# Patient Record
Sex: FEMALE | Race: WHITE | Employment: FULL TIME | URBAN - METROPOLITAN AREA
[De-identification: names, ages, dates, MRNs, and addresses within clinical notes are randomized per-mention and may not be internally consistent; named-entity substitution may affect disease eponyms.]

---

## 2018-12-07 ENCOUNTER — OFFICE VISIT (OUTPATIENT)
Dept: FAMILY MEDICINE CLINIC | Age: 23
End: 2018-12-07
Payer: COMMERCIAL

## 2018-12-07 VITALS
HEIGHT: 66 IN | RESPIRATION RATE: 18 BRPM | BODY MASS INDEX: 46.77 KG/M2 | SYSTOLIC BLOOD PRESSURE: 120 MMHG | HEART RATE: 98 BPM | DIASTOLIC BLOOD PRESSURE: 80 MMHG | TEMPERATURE: 98.5 F | OXYGEN SATURATION: 99 % | WEIGHT: 291 LBS

## 2018-12-07 DIAGNOSIS — E66.01 CLASS 3 SEVERE OBESITY DUE TO EXCESS CALORIES WITHOUT SERIOUS COMORBIDITY WITH BODY MASS INDEX (BMI) OF 45.0 TO 49.9 IN ADULT (HCC): ICD-10-CM

## 2018-12-07 DIAGNOSIS — R06.81 APNEIC EPISODE: ICD-10-CM

## 2018-12-07 DIAGNOSIS — F32.A ANXIETY AND DEPRESSION: ICD-10-CM

## 2018-12-07 DIAGNOSIS — R53.82 CHRONIC FATIGUE: Primary | ICD-10-CM

## 2018-12-07 DIAGNOSIS — F41.9 ANXIETY AND DEPRESSION: ICD-10-CM

## 2018-12-07 PROCEDURE — G8417 CALC BMI ABV UP PARAM F/U: HCPCS | Performed by: FAMILY MEDICINE

## 2018-12-07 PROCEDURE — G8484 FLU IMMUNIZE NO ADMIN: HCPCS | Performed by: FAMILY MEDICINE

## 2018-12-07 PROCEDURE — G8427 DOCREV CUR MEDS BY ELIG CLIN: HCPCS | Performed by: FAMILY MEDICINE

## 2018-12-07 PROCEDURE — 99214 OFFICE O/P EST MOD 30 MIN: CPT | Performed by: FAMILY MEDICINE

## 2018-12-07 PROCEDURE — 1036F TOBACCO NON-USER: CPT | Performed by: FAMILY MEDICINE

## 2018-12-07 RX ORDER — VENLAFAXINE HYDROCHLORIDE 75 MG/1
75 CAPSULE, EXTENDED RELEASE ORAL DAILY
Qty: 90 CAPSULE | Refills: 3 | Status: SHIPPED
Start: 2018-12-07 | End: 2020-07-14 | Stop reason: CLARIF

## 2018-12-07 ASSESSMENT — PATIENT HEALTH QUESTIONNAIRE - PHQ9
2. FEELING DOWN, DEPRESSED OR HOPELESS: 0
SUM OF ALL RESPONSES TO PHQ9 QUESTIONS 1 & 2: 0
SUM OF ALL RESPONSES TO PHQ QUESTIONS 1-9: 0
SUM OF ALL RESPONSES TO PHQ QUESTIONS 1-9: 0
1. LITTLE INTEREST OR PLEASURE IN DOING THINGS: 0

## 2018-12-19 ENCOUNTER — HOSPITAL ENCOUNTER (OUTPATIENT)
Dept: SLEEP CENTER | Age: 23
Discharge: HOME | End: 2018-12-19
Payer: COMMERCIAL

## 2018-12-19 DIAGNOSIS — R06.81 APNEIC EPISODE: ICD-10-CM

## 2018-12-19 DIAGNOSIS — R53.82 CHRONIC FATIGUE: ICD-10-CM

## 2018-12-19 DIAGNOSIS — E66.01 CLASS 3 SEVERE OBESITY DUE TO EXCESS CALORIES WITHOUT SERIOUS COMORBIDITY WITH BODY MASS INDEX (BMI) OF 45.0 TO 49.9 IN ADULT (HCC): ICD-10-CM

## 2018-12-19 PROCEDURE — 95810 POLYSOM 6/> YRS 4/> PARAM: CPT

## 2018-12-20 VITALS
SYSTOLIC BLOOD PRESSURE: 134 MMHG | DIASTOLIC BLOOD PRESSURE: 78 MMHG | HEIGHT: 66 IN | BODY MASS INDEX: 46.77 KG/M2 | OXYGEN SATURATION: 99 % | HEART RATE: 105 BPM | WEIGHT: 291 LBS

## 2018-12-20 ASSESSMENT — SLEEP AND FATIGUE QUESTIONNAIRES
HOW LIKELY ARE YOU TO NOD OFF OR FALL ASLEEP WHEN YOU ARE A PASSENGER IN A CAR FOR AN HOUR WITHOUT A BREAK: 2
HOW LIKELY ARE YOU TO NOD OFF OR FALL ASLEEP WHILE SITTING INACTIVE IN A PUBLIC PLACE: 2
HOW LIKELY ARE YOU TO NOD OFF OR FALL ASLEEP WHILE LYING DOWN TO REST IN THE AFTERNOON WHEN CIRCUMSTANCES PERMIT: 3
HOW LIKELY ARE YOU TO NOD OFF OR FALL ASLEEP WHILE SITTING AND READING: 3
HOW LIKELY ARE YOU TO NOD OFF OR FALL ASLEEP WHILE WATCHING TV: 3
HOW LIKELY ARE YOU TO NOD OFF OR FALL ASLEEP IN A CAR, WHILE STOPPED FOR A FEW MINUTES IN TRAFFIC: 2
HOW LIKELY ARE YOU TO NOD OFF OR FALL ASLEEP WHILE SITTING QUIETLY AFTER LUNCH WITHOUT ALCOHOL: 2
ESS TOTAL SCORE: 19
HOW LIKELY ARE YOU TO NOD OFF OR FALL ASLEEP WHILE SITTING AND TALKING TO SOMEONE: 2

## 2018-12-25 NOTE — PROGRESS NOTES
[address] [address]                               SLEEP STUDY REPORT    PATIENT NAME: Merrill Cruz                     :        1995  MED REC NO:   [id number]                            ROOM:  ACCOUNT NO:   [de-identified]                           ADMIT DATE: 2018  PROVIDER:     Angela Gardner MD    DATE OF STUDY:  2018    REFERRING PROVIDER:  Marlys Hernandez M.D.    STUDY PERFORMED:  Polysomnography. INDICATION FOR POLYSOMNOGRAPHY:  Wakes gasping, snore, restless sleep,  morning headaches, trouble with memory/concentration, nocturnal  diaphoresis, and sleep walking/talking. CURRENT MEDICATIONS:  Venlafaxine. INTERPRETATION:  SLEEP ARCHITECTURE:  This patient had a total time in bed of 378  minutes. Total sleep time was 300 minutes. Sleep efficiency was 79%. Sleep latency was 17 minutes. REM latency was 298 minutes. SLEEP STAGING:  The patient was awake 21% of the time in bed. Stage N1  was 12% and N2 was 62% of the total sleep time. Slow wave sleep was 21%  of the sleep time and stage REM sleep was 5% of the sleep time. RESPIRATION SUMMARY:  APNEA:  There were no apneic events. HYPOPNEA:  There was 17 hypopneic events, eight occurred during REM  stage sleep. Maximum duration was 38 seconds. APNEA/HYPOPNEA INDEX:  The apnea/hypopnea index is three. All events  occurred in the supine position. AROUSAL ANALYSIS:  There were 45 arousals/awakenings, the sleep  disruption index was normal.    LIMB MOVEMENT SUMMARY:  There were 12 limb movements, the limb movement  index was normal.    OXYGEN SATURATION:  Baseline oxygen saturation was 97% while awake. Lowest saturation was 87%. The patient spent less than 1% of the time  with saturation less than 90%. HEART RATE SUMMARY:  Average heart rate while awake was 96 beats per  minute.   Maximum heart rate during the sleep was 114

## 2019-01-15 ENCOUNTER — TELEPHONE (OUTPATIENT)
Dept: FAMILY MEDICINE CLINIC | Age: 24
End: 2019-01-15

## 2019-08-01 ENCOUNTER — OFFICE VISIT (OUTPATIENT)
Dept: FAMILY MEDICINE CLINIC | Age: 24
End: 2019-08-01
Payer: COMMERCIAL

## 2019-08-01 VITALS
HEIGHT: 66 IN | RESPIRATION RATE: 16 BRPM | TEMPERATURE: 99.1 F | SYSTOLIC BLOOD PRESSURE: 123 MMHG | BODY MASS INDEX: 47.09 KG/M2 | HEART RATE: 89 BPM | DIASTOLIC BLOOD PRESSURE: 79 MMHG | WEIGHT: 293 LBS | OXYGEN SATURATION: 98 %

## 2019-08-01 DIAGNOSIS — F41.9 ANXIETY AND DEPRESSION: Primary | ICD-10-CM

## 2019-08-01 DIAGNOSIS — G47.411 PRIMARY NARCOLEPSY WITH CATAPLEXY: ICD-10-CM

## 2019-08-01 DIAGNOSIS — F32.A ANXIETY AND DEPRESSION: Primary | ICD-10-CM

## 2019-08-01 DIAGNOSIS — F90.9 ATTENTION DEFICIT HYPERACTIVITY DISORDER (ADHD), UNSPECIFIED ADHD TYPE: ICD-10-CM

## 2019-08-01 DIAGNOSIS — R53.82 CHRONIC FATIGUE: ICD-10-CM

## 2019-08-01 PROCEDURE — G8417 CALC BMI ABV UP PARAM F/U: HCPCS | Performed by: FAMILY MEDICINE

## 2019-08-01 PROCEDURE — 1036F TOBACCO NON-USER: CPT | Performed by: FAMILY MEDICINE

## 2019-08-01 PROCEDURE — 99214 OFFICE O/P EST MOD 30 MIN: CPT | Performed by: FAMILY MEDICINE

## 2019-08-01 PROCEDURE — G8427 DOCREV CUR MEDS BY ELIG CLIN: HCPCS | Performed by: FAMILY MEDICINE

## 2019-08-01 RX ORDER — BUSPIRONE HYDROCHLORIDE 7.5 MG/1
7.5 TABLET ORAL
Qty: 30 TABLET | Refills: 5 | Status: SHIPPED
Start: 2019-08-01 | End: 2020-07-14 | Stop reason: CLARIF

## 2019-08-01 RX ORDER — METHYLPHENIDATE HYDROCHLORIDE 18 MG/1
36 TABLET ORAL EVERY MORNING
Qty: 30 TABLET | Refills: 0 | Status: SHIPPED
Start: 2019-08-01 | End: 2020-07-14 | Stop reason: CLARIF

## 2019-08-01 ASSESSMENT — PATIENT HEALTH QUESTIONNAIRE - PHQ9
SUM OF ALL RESPONSES TO PHQ QUESTIONS 1-9: 0
SUM OF ALL RESPONSES TO PHQ9 QUESTIONS 1 & 2: 0
SUM OF ALL RESPONSES TO PHQ QUESTIONS 1-9: 0
2. FEELING DOWN, DEPRESSED OR HOPELESS: 0
1. LITTLE INTEREST OR PLEASURE IN DOING THINGS: 0

## 2019-08-01 NOTE — PROGRESS NOTES
Anxiety and/or depression:  Patient is here with complaints of anxiety and/or depression. This is a/an chronic problem. This has been going on for more than 6 months. Treatment in the past includes lexapro, effexor, buspar. She was seen by a psychiatrist in [facility] recently who also diagnosed ADHD. He started Chandana Qureshi on concerta. This has really helped her symptoms as well. She did have a normal sleep study-no sleep apnea, however the sleep specialist is concerned about narcolepsy. Unfortunately, the patient was not comfortable with the bedside manner of this particular specialist.  She would like to talk to another sleep specialist.  .  Patient does not use alcohol and/or drugs. Patient has no complaints. She said the medication is helping. Patient's past medical, surgical, social and/or family history reviewed, updated in chart, and are non-contributory (unless otherwise stated). Medications and allergies also reviewed and updated in chart.          Review of Systems:  Constitutional:  No fever, no fatigue, no chills, no headaches, no weight change  Dermatology:  No rash, no mole, no dry or sensitive skin  ENT:  No cough, no sore throat, no sinus pain, no runny nose, no ear pain  Cardiology:  No chest pain, no palpitations, no leg edema, no shortness of breath, no PND  Gastroenterology:  No dysphagia, no abdominal pain, no nausea, no vomiting, no constipation, no diarrhea, no heartburn  Musculoskeletal:  No joint pain, no leg cramps, no back pain, no muscle aches  Respiratory:  No shortness of breath, no orthopnea, no wheezing, no GONZALES, no hemoptysis  Urology:  No blood in the urine, no urinary frequency, no urinary incontinence, no urinary urgency, no nocturia, no dysuria    Vitals:    08/01/19 1128   BP: 123/79   Pulse: 89   Resp: 16   Temp: 99.1 °F (37.3 °C)   TempSrc: Oral   SpO2: 98%   Weight: 294 lb (133.4 kg)   Height: 5' 6\" (1.676 m)       General:  Patient alert and oriented x 3, NAD, pathology, especially cancer, as well as protecting against potentially harmful/life threatening disease. Patient and/or guardian verbalizes understanding and agrees with above counseling, assessment and plan. All questions answered. Jennifer Rawls MD  8/1/2019    I have personally reviewed and updated the chief complaint, HPI, Past Medical, Family and Social History, as well as the above Review of Systems.

## 2020-07-14 ENCOUNTER — HOSPITAL ENCOUNTER (OUTPATIENT)
Age: 25
Discharge: HOME | End: 2020-07-16
Payer: COMMERCIAL

## 2020-07-14 ENCOUNTER — OFFICE VISIT (OUTPATIENT)
Dept: FAMILY MEDICINE CLINIC | Age: 25
End: 2020-07-14
Payer: COMMERCIAL

## 2020-07-14 VITALS
SYSTOLIC BLOOD PRESSURE: 122 MMHG | TEMPERATURE: 97.8 F | RESPIRATION RATE: 18 BRPM | OXYGEN SATURATION: 97 % | WEIGHT: 293 LBS | DIASTOLIC BLOOD PRESSURE: 82 MMHG | HEIGHT: 66 IN | BODY MASS INDEX: 47.09 KG/M2 | HEART RATE: 94 BPM

## 2020-07-14 LAB
ALBUMIN SERPL-MCNC: 4.2 G/DL (ref 3.5–5.2)
ALP BLD-CCNC: 84 U/L (ref 35–104)
ALT SERPL-CCNC: 12 U/L (ref 0–32)
ANION GAP SERPL CALCULATED.3IONS-SCNC: 16 MMOL/L (ref 7–16)
AST SERPL-CCNC: 17 U/L (ref 0–31)
BILIRUB SERPL-MCNC: 0.2 MG/DL (ref 0–1.2)
BUN BLDV-MCNC: 10 MG/DL (ref 6–20)
CALCIUM SERPL-MCNC: 9.4 MG/DL (ref 8.6–10.2)
CHLORIDE BLD-SCNC: 103 MMOL/L (ref 98–107)
CHOLESTEROL, TOTAL: 172 MG/DL (ref 0–199)
CO2: 23 MMOL/L (ref 22–29)
CREAT SERPL-MCNC: 0.5 MG/DL (ref 0.5–1)
GFR AFRICAN AMERICAN: >60
GFR NON-AFRICAN AMERICAN: >60 ML/MIN/1.73
GLUCOSE BLD-MCNC: 88 MG/DL (ref 74–99)
HCT VFR BLD CALC: 37 % (ref 34–48)
HDLC SERPL-MCNC: 35 MG/DL
HEMOGLOBIN: 10.1 G/DL (ref 11.5–15.5)
LDL CHOLESTEROL CALCULATED: 107 MG/DL (ref 0–99)
MCH RBC QN AUTO: 19.8 PG (ref 26–35)
MCHC RBC AUTO-ENTMCNC: 27.3 % (ref 32–34.5)
MCV RBC AUTO: 72.4 FL (ref 80–99.9)
PDW BLD-RTO: 17.6 FL (ref 11.5–15)
PLATELET # BLD: 368 E9/L (ref 130–450)
PMV BLD AUTO: 8.8 FL (ref 7–12)
POTASSIUM SERPL-SCNC: 4.7 MMOL/L (ref 3.5–5)
RBC # BLD: 5.11 E12/L (ref 3.5–5.5)
SODIUM BLD-SCNC: 142 MMOL/L (ref 132–146)
TOTAL PROTEIN: 7.1 G/DL (ref 6.4–8.3)
TRIGL SERPL-MCNC: 148 MG/DL (ref 0–149)
TSH SERPL DL<=0.05 MIU/L-ACNC: 5.1 UIU/ML (ref 0.27–4.2)
VITAMIN D 25-HYDROXY: 24 NG/ML (ref 30–100)
VLDLC SERPL CALC-MCNC: 30 MG/DL
WBC # BLD: 7.3 E9/L (ref 4.5–11.5)

## 2020-07-14 PROCEDURE — 80053 COMPREHEN METABOLIC PANEL: CPT

## 2020-07-14 PROCEDURE — 84443 ASSAY THYROID STIM HORMONE: CPT

## 2020-07-14 PROCEDURE — 85027 COMPLETE CBC AUTOMATED: CPT

## 2020-07-14 PROCEDURE — 99213 OFFICE O/P EST LOW 20 MIN: CPT | Performed by: FAMILY MEDICINE

## 2020-07-14 PROCEDURE — 80061 LIPID PANEL: CPT

## 2020-07-14 PROCEDURE — 82306 VITAMIN D 25 HYDROXY: CPT

## 2020-07-14 PROCEDURE — 99395 PREV VISIT EST AGE 18-39: CPT | Performed by: FAMILY MEDICINE

## 2020-07-14 RX ORDER — VENLAFAXINE HYDROCHLORIDE 150 MG/1
150 CAPSULE, EXTENDED RELEASE ORAL DAILY
COMMUNITY
End: 2021-08-17

## 2020-07-14 NOTE — PROGRESS NOTES
[facility])  Family Medicine Outpatient        SUBJECTIVE:  CC: had concerns including Established New Doctor (Pt here to establish care with PCP - denies any current issues, follows with Psych for Effexor). Severiano Lank presented to the clinic for a routine visit. Eual Soulier is a 26year old female presenting to the office to establish with a new provider at this time. Her previous doctor was Dr. Teena Briseno. She reports wanting a preventative physical. She has a history of anxiety/depression, adhd, and narcolepsy with cataplexy. She reports never being sexually active in the past. Her lmp was 7/3/20 and she notes them being regular. She is on Effexor for her mood and reports tolerating it well. She denies any s/h ideations. She follows with psychiatry and a counselor. Review of Systems   Constitutional: Positive for fatigue. Negative for appetite change and fever. Respiratory: Negative for cough, shortness of breath and wheezing. Cardiovascular: Negative for chest pain and palpitations. Gastrointestinal: Negative for abdominal pain, constipation, diarrhea, nausea and vomiting. Psychiatric/Behavioral: Negative for suicidal ideas. The patient is nervous/anxious. Depression, adhd       Outpatient Medications Marked as Taking for the 7/14/20 encounter (Office Visit) with Neida Lozada MD   Medication Sig Dispense Refill    venlafaxine (EFFEXOR XR) 150 MG extended release capsule Take 150 mg by mouth daily Indications: PRESCRIBED BY BRAENNA JEFFERSON         I have reviewed all pertinent PMHx, PSHx, FamHx, SocialHx, medications, and allergies and updated history as appropriate. OBJECTIVE    VS: /82   Pulse 94   Temp 97.8 °F (36.6 °C) (Temporal)   Resp 18   Ht 5' 6\" (1.676 m)   Wt (!) 305 lb (138.3 kg)   LMP 07/03/2020 (Exact Date)   SpO2 97%   Breastfeeding No   BMI 49.23 kg/m²   Physical Exam  Constitutional:       General: She is not in acute distress.      Appearance: She is well-developed. She is obese. She is not diaphoretic. HENT:      Head: Normocephalic and atraumatic. Right Ear: Tympanic membrane, ear canal and external ear normal.      Left Ear: Tympanic membrane, ear canal and external ear normal.      Mouth/Throat:      Mouth: Mucous membranes are moist.      Pharynx: No oropharyngeal exudate or posterior oropharyngeal erythema. Eyes:      Conjunctiva/sclera: Conjunctivae normal.      Pupils: Pupils are equal, round, and reactive to light. Neck:      Musculoskeletal: Normal range of motion and neck supple. Cardiovascular:      Rate and Rhythm: Normal rate and regular rhythm. Pulmonary:      Effort: Pulmonary effort is normal.      Breath sounds: Normal breath sounds. Abdominal:      General: Bowel sounds are normal. There is no distension. Palpations: Abdomen is soft. Tenderness: There is no abdominal tenderness. Hernia: No hernia is present. Musculoskeletal: Normal range of motion. General: No swelling or tenderness. Skin:     General: Skin is warm and dry. Neurological:      Mental Status: She is alert and oriented to person, place, and time. Sensory: No sensory deficit. Motor: No weakness. Psychiatric:         Mood and Affect: Mood normal.         Behavior: Behavior normal.         ASSESSMENT/PLAN:  1. Encounter for preventative adult health care examination  Physical exam as above. Tdap 2014. Flu vaccine in Fall 2020. Lmp 7/3/20 and patient has never been sexually active. Mood disorder see #6. Neg smoker. 2. BMI 45.0-49.9, adult (HCC)  - CBC; Future  - Comprehensive Metabolic Panel; Future  - TSH without Reflex; Future  - Vitamin D 25 Hydroxy; Future    3. Lipid screening  - Lipid Panel; Future    4. Primary narcolepsy with cataplexy  Patient previously saw Dr. Destiny Epstein. PSG was negat for VINNY. MSLT ordered, but never completed. Refer to sleep medicine, Dr. Hernandez Dears at this time for follow up.  Patient reports using prn Melatonin for sleep. - Tessa Santana MD, Sleep Medicine, [place]    5. History of ADHD    6. Anxiety/Depression  Patient follows with Psychiatry and counselor. Patient is on Effexor. No s/h ideations. I have reviewed my findings and recommendations with Geneva Wood MD  7/16/2020 1:51 PM     Counseled regarding above diagnosis, including possible risks and complications, especially if left uncontrolled. Patient counseled on red flag symptoms and if they occur to go to the ED. Discussed medications risk/benefits and possible side effects and alternatives to treatment. Patient and/or guardian verbalizes understanding, agrees, feels comfortable with and wishes to proceed with above treatment plan. Advised patient regarding importance of keeping up with recommended health maintenance and to schedule as soon as possible if overdue, as this is important in assessing for undiagnosed pathology, especially cancer, as well as protecting against potentially harmful/life threatening disease. Patient and/or guardian verbalizes understanding and agrees with above counseling, assessment and plan. All questions answered. Please note this report has been partially produced using speech recognition software  and may contain errors related to that system including grammar, punctuation and spelling as well as words and phrases that may seem inappropriate. If there are questions or concerns please feel free to contact me to clarify.

## 2020-07-16 ASSESSMENT — ENCOUNTER SYMPTOMS
ABDOMINAL PAIN: 0
COUGH: 0
SHORTNESS OF BREATH: 0
CONSTIPATION: 0
WHEEZING: 0
NAUSEA: 0
VOMITING: 0
DIARRHEA: 0

## 2020-07-23 ENCOUNTER — HOSPITAL ENCOUNTER (OUTPATIENT)
Age: 25
Discharge: HOME | End: 2020-07-25
Payer: COMMERCIAL

## 2020-07-23 ENCOUNTER — NURSE ONLY (OUTPATIENT)
Dept: FAMILY MEDICINE CLINIC | Age: 25
End: 2020-07-23
Payer: COMMERCIAL

## 2020-07-23 LAB
ANISOCYTOSIS: ABNORMAL
BASOPHILS ABSOLUTE: 0.04 E9/L (ref 0–0.2)
BASOPHILS RELATIVE PERCENT: 0.6 % (ref 0–2)
BURR CELLS: ABNORMAL
EOSINOPHILS ABSOLUTE: 0.23 E9/L (ref 0.05–0.5)
EOSINOPHILS RELATIVE PERCENT: 3.3 % (ref 0–6)
FERRITIN: 21 NG/ML
HCT VFR BLD CALC: 37.2 % (ref 34–48)
HEMOGLOBIN: 10.3 G/DL (ref 11.5–15.5)
HYPOCHROMIA: ABNORMAL
IMMATURE GRANULOCYTES #: 0.02 E9/L
IMMATURE GRANULOCYTES %: 0.3 % (ref 0–5)
IRON SATURATION: 10 % (ref 15–50)
IRON: 34 MCG/DL (ref 37–145)
LYMPHOCYTES ABSOLUTE: 2.23 E9/L (ref 1.5–4)
LYMPHOCYTES RELATIVE PERCENT: 32.1 % (ref 20–42)
MCH RBC QN AUTO: 20 PG (ref 26–35)
MCHC RBC AUTO-ENTMCNC: 27.7 % (ref 32–34.5)
MCV RBC AUTO: 72.1 FL (ref 80–99.9)
MONOCYTES ABSOLUTE: 0.45 E9/L (ref 0.1–0.95)
MONOCYTES RELATIVE PERCENT: 6.5 % (ref 2–12)
NEUTROPHILS ABSOLUTE: 3.98 E9/L (ref 1.8–7.3)
NEUTROPHILS RELATIVE PERCENT: 57.2 % (ref 43–80)
OVALOCYTES: ABNORMAL
PDW BLD-RTO: 17.7 FL (ref 11.5–15)
PLATELET # BLD: 380 E9/L (ref 130–450)
PMV BLD AUTO: 9 FL (ref 7–12)
POIKILOCYTES: ABNORMAL
POLYCHROMASIA: ABNORMAL
RBC # BLD: 5.16 E12/L (ref 3.5–5.5)
T4 FREE: 0.8 NG/DL (ref 0.93–1.7)
TEAR DROP CELLS: ABNORMAL
TOTAL IRON BINDING CAPACITY: 350 MCG/DL (ref 250–450)
TSH SERPL DL<=0.05 MIU/L-ACNC: 5.34 UIU/ML (ref 0.27–4.2)
WBC # BLD: 7 E9/L (ref 4.5–11.5)

## 2020-07-23 PROCEDURE — 84443 ASSAY THYROID STIM HORMONE: CPT

## 2020-07-23 PROCEDURE — 36415 COLL VENOUS BLD VENIPUNCTURE: CPT | Performed by: FAMILY MEDICINE

## 2020-07-23 PROCEDURE — 85025 COMPLETE CBC W/AUTO DIFF WBC: CPT

## 2020-07-23 PROCEDURE — 84439 ASSAY OF FREE THYROXINE: CPT

## 2020-07-23 PROCEDURE — 83550 IRON BINDING TEST: CPT

## 2020-07-23 PROCEDURE — 82728 ASSAY OF FERRITIN: CPT

## 2020-07-23 PROCEDURE — 83540 ASSAY OF IRON: CPT

## 2020-07-23 NOTE — PROGRESS NOTES
Labs drawn per Dr. Willard Mckeon orders.     Electronically signed by Yolanda Dominguez MA on 7/23/20 at 8:26 AM EDT

## 2020-07-28 RX ORDER — LEVOTHYROXINE SODIUM 0.03 MG/1
25 TABLET ORAL DAILY
Qty: 90 TABLET | Refills: 1 | Status: SHIPPED
Start: 2020-07-28 | End: 2021-01-02 | Stop reason: ALTCHOICE

## 2020-07-28 RX ORDER — LEVOTHYROXINE SODIUM 0.03 MG/1
25 TABLET ORAL DAILY
COMMUNITY
End: 2020-07-28 | Stop reason: SDUPTHER

## 2020-09-29 ENCOUNTER — HOSPITAL ENCOUNTER (OUTPATIENT)
Age: 25
Discharge: HOME | End: 2020-10-01
Payer: COMMERCIAL

## 2020-09-29 ENCOUNTER — NURSE ONLY (OUTPATIENT)
Dept: FAMILY MEDICINE CLINIC | Age: 25
End: 2020-09-29
Payer: COMMERCIAL

## 2020-09-29 LAB
HCT VFR BLD CALC: 40.8 % (ref 34–48)
HEMOGLOBIN: 12.1 G/DL (ref 11.5–15.5)
MCH RBC QN AUTO: 23.7 PG (ref 26–35)
MCHC RBC AUTO-ENTMCNC: 29.7 % (ref 32–34.5)
MCV RBC AUTO: 79.8 FL (ref 80–99.9)
PDW BLD-RTO: 19.7 FL (ref 11.5–15)
PLATELET # BLD: 289 E9/L (ref 130–450)
PMV BLD AUTO: 8.8 FL (ref 7–12)
RBC # BLD: 5.11 E12/L (ref 3.5–5.5)
TSH SERPL DL<=0.05 MIU/L-ACNC: 2.98 UIU/ML (ref 0.27–4.2)
WBC # BLD: 7.1 E9/L (ref 4.5–11.5)

## 2020-09-29 PROCEDURE — 85027 COMPLETE CBC AUTOMATED: CPT

## 2020-09-29 PROCEDURE — 36415 COLL VENOUS BLD VENIPUNCTURE: CPT | Performed by: FAMILY MEDICINE

## 2020-09-29 PROCEDURE — 84443 ASSAY THYROID STIM HORMONE: CPT

## 2020-12-01 ENCOUNTER — TELEPHONE (OUTPATIENT)
Dept: SLEEP MEDICINE | Age: 25
End: 2020-12-01

## 2020-12-28 ENCOUNTER — TELEPHONE (OUTPATIENT)
Dept: ADMINISTRATIVE | Age: 25
End: 2020-12-28

## 2021-01-02 ENCOUNTER — OFFICE VISIT (OUTPATIENT)
Dept: PRIMARY CARE CLINIC | Age: 26
End: 2021-01-02
Payer: COMMERCIAL

## 2021-01-02 VITALS
HEART RATE: 92 BPM | RESPIRATION RATE: 16 BRPM | OXYGEN SATURATION: 95 % | BODY MASS INDEX: 45 KG/M2 | SYSTOLIC BLOOD PRESSURE: 137 MMHG | HEIGHT: 66 IN | TEMPERATURE: 98.3 F | DIASTOLIC BLOOD PRESSURE: 88 MMHG | WEIGHT: 280 LBS

## 2021-01-02 DIAGNOSIS — U07.1 COVID-19: Primary | ICD-10-CM

## 2021-01-02 LAB
Lab: ABNORMAL
QC PASS/FAIL: ABNORMAL
SARS-COV-2, POC: DETECTED

## 2021-01-02 PROCEDURE — 87426 SARSCOV CORONAVIRUS AG IA: CPT | Performed by: NURSE PRACTITIONER

## 2021-01-02 PROCEDURE — 99213 OFFICE O/P EST LOW 20 MIN: CPT | Performed by: NURSE PRACTITIONER

## 2021-01-02 RX ORDER — AZITHROMYCIN 250 MG/1
250 TABLET, FILM COATED ORAL DAILY
Qty: 6 TABLET | Refills: 0 | Status: SHIPPED
Start: 2021-01-02 | End: 2021-08-17 | Stop reason: ALTCHOICE

## 2021-01-02 RX ORDER — ZINC SULFATE 50(220)MG
50 CAPSULE ORAL DAILY
Qty: 7 CAPSULE | Refills: 0 | Status: SHIPPED
Start: 2021-01-02 | End: 2021-08-17 | Stop reason: ALTCHOICE

## 2021-01-02 RX ORDER — DEXTROAMPHETAMINE SACCHARATE, AMPHETAMINE ASPARTATE, DEXTROAMPHETAMINE SULFATE AND AMPHETAMINE SULFATE 5; 5; 5; 5 MG/1; MG/1; MG/1; MG/1
20 TABLET ORAL DAILY PRN
COMMUNITY
Start: 2020-12-09

## 2021-01-02 NOTE — PROGRESS NOTES
21  Esther Arauz : 1995 Sex: female  Age 26 y.o. Subjective:  Chief Complaint   Patient presents with    Other     loss of taste and smell X3 days    Pharyngitis     X5 days    Generalized Body Aches     X5 days    Nasal Congestion     X5 days       HPI:   Esther Arauz , 26 y.o. female presents to the clinic for evaluation of mild sob with activity, loss of taste and smell, sore throat (resolved), sinus congestion, and body aches x 2 days. The patient has not taken any treatments for symptoms. The patient reports unchanged symptoms over time. The patient reports no known ill exposure. The patient denies headache, cough, sore throat, rash, fever, and ear discomfort. The patient also denies chest pain, abdominal pain, and nausea / vomiting / diarrhea. ROS:   Unless otherwise stated in this report the patient's positive and negative responses for review of systems for constitutional, eyes, ENT, cardiovascular, respiratory, gastrointestinal, neurological, , musculoskeletal, and integument systems and related systems to the presenting problem are either stated in the history of present illness or were not pertinent or were negative for the symptoms and/or complaints related to the presenting medical problem. Positives and pertinent negatives as per HPI. All others reviewed and are negative. PMH:     Past Medical History:   Diagnosis Date    ADHD     Anxiety        History reviewed. No pertinent surgical history.     Family History   Problem Relation Age of Onset    No Known Problems Mother     Sleep Apnea Father     ADHD Father     No Known Problems Sister     ADHD Brother     No Known Problems Sister        Medications:     Current Outpatient Medications:     amphetamine-dextroamphetamine (ADDERALL) 5 MG tablet, TAKE 1 TABLET BY MOUTH TWICE DAILY, Disp: , Rfl:   azithromycin (ZITHROMAX Z-GERMAIN) 250 MG tablet, Take 1 tablet by mouth daily Take 2 tabs on day one, then 1 tab daily for the next 4 days, Disp: 6 tablet, Rfl: 0    Ascorbic Acid (VITAMIN C) 500 MG CHEW, Take 1 tablet by mouth 2 times daily for 7 days, Disp: 14 tablet, Rfl: 0    zinc sulfate (ZINC-220) 220 (50 Zn) MG capsule, Take 1 capsule by mouth daily for 7 days, Disp: 7 capsule, Rfl: 0    venlafaxine (EFFEXOR XR) 150 MG extended release capsule, Take 150 mg by mouth daily Indications: PRESCRIBED BY BREANNA JEFFERSON, Disp: , Rfl:     Allergies:   No Known Allergies    Social History:     Social History     Tobacco Use    Smoking status: Never Smoker    Smokeless tobacco: Never Used   Substance Use Topics    Alcohol use: No     Alcohol/week: 0.0 standard drinks    Drug use: Never       Patient lives at home. Physical Exam:     Vitals:    01/02/21 0938   BP: 137/88   Pulse: 92   Resp: 16   Temp: 98.3 °F (36.8 °C)   SpO2: 95%   Weight: 280 lb (127 kg)   Height: 5' 6\" (1.676 m)       Physical Exam (PE)    Physical Exam  Constitutional:       Appearance: Normal appearance. HENT:      Head: Normocephalic. Right Ear: Tympanic membrane, ear canal and external ear normal.      Left Ear: Tympanic membrane, ear canal and external ear normal.      Nose: Congestion and rhinorrhea present. Mouth/Throat:      Mouth: Mucous membranes are moist.      Pharynx: Oropharynx is clear. Eyes:      Pupils: Pupils are equal, round, and reactive to light. Neck:      Musculoskeletal: Normal range of motion and neck supple. Cardiovascular:      Rate and Rhythm: Normal rate and regular rhythm. Pulses: Normal pulses. Heart sounds: Normal heart sounds. Pulmonary:      Effort: Pulmonary effort is normal.      Breath sounds: Normal breath sounds. No wheezing, rhonchi or rales. Abdominal:      General: Bowel sounds are normal.      Palpations: Abdomen is soft. Musculoskeletal: Normal range of motion. Lymphadenopathy:      Cervical: No cervical adenopathy. Skin:     General: Skin is warm and dry. Capillary Refill: Capillary refill takes less than 2 seconds. Neurological:      General: No focal deficit present. Mental Status: She is alert and oriented to person, place, and time. Psychiatric:         Mood and Affect: Mood normal.         Behavior: Behavior normal.          Testing:   (All laboratory and radiology results have been personally reviewed by myself)  Labs:  Results for orders placed or performed in visit on 01/02/21   POCT COVID-19, Antigen   Result Value Ref Range    SARS-COV-2, POC Detected Not Detected    Lot Number [id number]     QC Pass/Fail pass        Imaging: All Radiology results interpreted by Radiologist unless otherwise noted. No orders to display       Assessment / Plan:   The patient's vitals, allergies, medications, and past medical history have been reviewed. Rosalba Sauceda was seen today for other, pharyngitis, generalized body aches and nasal congestion. Diagnoses and all orders for this visit:    COVID-19  -     POCT COVID-19, Antigen  -     azithromycin (ZITHROMAX Z-GERMAIN) 250 MG tablet; Take 1 tablet by mouth daily Take 2 tabs on day one, then 1 tab daily for the next 4 days  -     Ascorbic Acid (VITAMIN C) 500 MG CHEW; Take 1 tablet by mouth 2 times daily for 7 days  -     zinc sulfate (ZINC-220) 220 (50 Zn) MG capsule; Take 1 capsule by mouth daily for 7 days        - Discussed the benefits of daily vitamin C 1 g and zinc 50 mg for immune support. - Advised cautionary self-quarantine at home in the interim per CDC guidelines. Increase fluids and rest. Symptomatic relief discussed including Tylenol prn pain/fever. Schedule f/u with PCP in 2-3 days. Red flag symptoms were discussed with the patient today. The patient is directed to go the ED if symptoms worsen or change. Pt verbalizes understanding and is in agreement with plan of care. All questions answered.

## 2021-01-28 LAB
PAP SMEAR, EXTERNAL: NEGATIVE
PAP SMEAR, EXTERNAL: NEGATIVE

## 2021-08-17 ENCOUNTER — OFFICE VISIT (OUTPATIENT)
Dept: FAMILY MEDICINE CLINIC | Age: 26
End: 2021-08-17
Payer: COMMERCIAL

## 2021-08-17 VITALS
DIASTOLIC BLOOD PRESSURE: 80 MMHG | HEIGHT: 66 IN | RESPIRATION RATE: 16 BRPM | SYSTOLIC BLOOD PRESSURE: 120 MMHG | TEMPERATURE: 97.1 F | BODY MASS INDEX: 47.09 KG/M2 | WEIGHT: 293 LBS | OXYGEN SATURATION: 98 % | HEART RATE: 83 BPM

## 2021-08-17 DIAGNOSIS — Z23 NEED FOR DIPHTHERIA-TETANUS-PERTUSSIS (TDAP) VACCINE: ICD-10-CM

## 2021-08-17 DIAGNOSIS — F32.A ANXIETY AND DEPRESSION: ICD-10-CM

## 2021-08-17 DIAGNOSIS — E03.9 ACQUIRED HYPOTHYROIDISM: Primary | ICD-10-CM

## 2021-08-17 DIAGNOSIS — Z11.59 ENCOUNTER FOR HEPATITIS C SCREENING TEST FOR LOW RISK PATIENT: ICD-10-CM

## 2021-08-17 DIAGNOSIS — E66.01 MORBID OBESITY (HCC): ICD-10-CM

## 2021-08-17 DIAGNOSIS — Z00.01 ENCOUNTER FOR ROUTINE ADULT HEALTH EXAMINATION WITH ABNORMAL FINDINGS: ICD-10-CM

## 2021-08-17 DIAGNOSIS — F90.9 ATTENTION DEFICIT HYPERACTIVITY DISORDER (ADHD), UNSPECIFIED ADHD TYPE: ICD-10-CM

## 2021-08-17 DIAGNOSIS — D50.9 IRON DEFICIENCY ANEMIA, UNSPECIFIED IRON DEFICIENCY ANEMIA TYPE: ICD-10-CM

## 2021-08-17 DIAGNOSIS — F41.9 ANXIETY AND DEPRESSION: ICD-10-CM

## 2021-08-17 DIAGNOSIS — Z11.4 ENCOUNTER FOR SCREENING FOR HIV: ICD-10-CM

## 2021-08-17 PROCEDURE — 99395 PREV VISIT EST AGE 18-39: CPT | Performed by: FAMILY MEDICINE

## 2021-08-17 PROCEDURE — 99213 OFFICE O/P EST LOW 20 MIN: CPT | Performed by: FAMILY MEDICINE

## 2021-08-17 RX ORDER — ESCITALOPRAM OXALATE 20 MG/1
40 TABLET ORAL DAILY
COMMUNITY

## 2021-08-17 ASSESSMENT — ENCOUNTER SYMPTOMS
COUGH: 0
WHEEZING: 0
CONSTIPATION: 0
SHORTNESS OF BREATH: 0
VOMITING: 0
NAUSEA: 0
ABDOMINAL PAIN: 0
DIARRHEA: 0
BLOOD IN STOOL: 0

## 2021-08-17 NOTE — PROGRESS NOTES
[facility])  Family Medicine Outpatient        SUBJECTIVE:  CC: had concerns including Hypothyroidism, Anemia, and Health Maintenance (Patient is due for TDAP; pended. ). Brianne Ching presented to the clinic for a routine visit. Ivanna Allen is a 23year old female presenting to the office today for a physical exam and established visit. She reports following with Dr. Bijan Rowley for obgyn. She reports her pap being utd this year and was wnl. She reports her lmp was 8/6. She reports her periods last a week. She reports on a few days having to use tampon and pads for a heavy flow. Review of Systems   Constitutional: Positive for fatigue (episodic). Negative for appetite change and fever. Respiratory: Negative for cough, shortness of breath and wheezing. Cardiovascular: Negative for chest pain and palpitations. Gastrointestinal: Negative for abdominal pain, blood in stool, constipation, diarrhea, nausea and vomiting. Psychiatric/Behavioral: Negative for suicidal ideas. The patient is nervous/anxious. Depression, adhd     Outpatient Medications Marked as Taking for the 8/17/21 encounter (Office Visit) with Rosi Morgan MD   Medication Sig Dispense Refill    escitalopram (LEXAPRO) 20 MG tablet Take 30 mg by mouth daily      amphetamine-dextroamphetamine (ADDERALL) 5 MG tablet TAKE 1 TABLET BY MOUTH TWICE DAILY         I have reviewed all pertinent PMHx, PSHx, FamHx, SocialHx, medications, and allergies and updated history as appropriate. OBJECTIVE    VS: /80   Pulse 83   Temp 97.1 °F (36.2 °C)   Resp 16   Ht 5' 6\" (1.676 m)   Wt 298 lb (135.2 kg)   LMP 08/06/2021   SpO2 98%   Breastfeeding No   BMI 48.10 kg/m²   Physical Exam  Constitutional:       General: She is not in acute distress. Appearance: She is well-developed. She is obese. She is not diaphoretic. HENT:      Head: Normocephalic and atraumatic.    Eyes:      Conjunctiva/sclera: Conjunctivae normal. Pupils: Pupils are equal, round, and reactive to light. Cardiovascular:      Rate and Rhythm: Normal rate and regular rhythm. Pulmonary:      Effort: Pulmonary effort is normal.      Breath sounds: Normal breath sounds. Abdominal:      General: Bowel sounds are normal. There is no distension. Palpations: Abdomen is soft. Tenderness: There is no abdominal tenderness. Hernia: No hernia is present. Musculoskeletal:      Cervical back: Normal range of motion and neck supple. Skin:     General: Skin is warm and dry. Neurological:      Mental Status: She is alert and oriented to person, place, and time. ASSESSMENT/PLAN:  1. Acquired hypothyroidism  Patient has been off synthroid for a while. Recheck labs today and advise  - THYROID STIMULATING IMMUNOGLOBULIN; Future  - THYROID PEROXIDASE ANTIBODY; Future    2. Iron deficiency anemia, unspecified iron deficiency anemia type  Historic. Patient has not been using iron supplementation in a while. Lmp 8/6/21. Patient follows with obgyn for female health care. She denies any blood in her stool.   - Iron and TIBC; Future  - Transferrin; Future  - Ferritin; Future  - CBC; Future    3. Morbid obesity ([address]  Encourage weight reduction with calorie restriction and exercise 150 min/week. Continue to monitor. 4. Anxiety and depression  - Comprehensive Metabolic Panel; Future  - Vitamin D 25 Hydroxy; Future    5. Attention deficit hyperactivity disorder (ADHD), unspecified ADHD type  #4-5 Patient is following with Psychiatry in [facility] for her mental health. Denies any s/h ideations. - T4, Free; Future  - TSH without Reflex; Future    6. Need for diphtheria-tetanus-pertussis (Tdap) vaccine  - Tetanus-Diphth-Acell Pertussis (BOOSTRIX) injection 0.5 mL    7. Encounter for screening for HIV  - HIV Screen; Future    8. Encounter for hepatitis C screening test for low risk patient  - Hepatitis C Antibody; Future    9.  Adult Health examination    I have

## 2021-08-25 RX ORDER — ERGOCALCIFEROL 1.25 MG/1
50000 CAPSULE ORAL WEEKLY
Qty: 12 CAPSULE | Refills: 0 | Status: SHIPPED
Start: 2021-08-25 | End: 2022-02-24

## 2022-02-23 NOTE — PROGRESS NOTES
[facility] Internal Medicine      SUBJECTIVE:  Maikel Puga (:  1995) is a 23 y.o. female here for evaluation of the following chief complaint(s):  Establish Care  Here to establish care. Previous PCP - Elizabeth Thompson. Hypothyroidism - had low T4 but normal TSH - Levels need rechecked. + Dry skin especially on face. Some hair loss with showering.  + hot but not a new change. Check thyroid labs    Iron Deficiency anemia - labs of 2021 showed low iron and low TIBC. Was told to take iron only at the time of her menstruation. Regular menses. Reports heavy periods. Most recent menses was not heavy. Check CBC to reassess     Psoriasis - sees Dr. Caryn Wiley Dermat[facility]. - PRN ointiment. Obesity - elevated BMI. Will discuss further at next visit. ADHD - on Adderall. Sees Dr. Óscar Zapata in [facility] [place]. Has been seeing over one year. PRN clonazepam.  Takes 40 mg of lexapro since the the summer. Anxiety and Depression - follows with Psychiatry. On escitalopram. Became worse in college. Was on venlafaxine in the past, concerta as well as buspar. Has been seeing a therapist every 2-3 weeks in [facility][note 3]. Occasional awakening. Continue follow-up with [place]. Had one episode last week where she woke up from sleep feeling short of breath and felt nauseated. Lasted about 10 minutes. No emesis. Monitor for further episodes    GYN - Dr. Jarett Romeo. Due to see them later today. ? Narcolepsy - ? Need for sleep specialist. Was ordered a sleep latency test by Dr. Sultana Robbins. Reports multiple alarms needed to get up. No trouble falling asleep. Tired throughout the day. Does not feel rested when she wakes up. Some difficulty staying awake if not up and around. Referral placed to sleep medicine - Dr. Lola Pelayo. Low Vitamin D - was prescribed 50,000 Units weekly for 12 weeks.      Recheck Vitamin D level    Review of Systems   Constitutional: Negative for appetite change, chills and fever. HENT: Negative for congestion, ear pain, hearing loss, rhinorrhea, sinus pressure, sore throat and trouble swallowing. Eyes: Negative for visual disturbance. Respiratory: Positive for shortness of breath (one episode ). Negative for cough and chest tightness. Cardiovascular: Negative for chest pain, palpitations and leg swelling. Gastrointestinal: Negative for abdominal pain, blood in stool, constipation, diarrhea, nausea and vomiting. Endocrine: Positive for heat intolerance. Negative for cold intolerance. Genitourinary: Positive for menstrual problem (some heavy menses). Negative for dysuria. Musculoskeletal: Negative for arthralgias and joint swelling. Skin:        +dry skin   Neurological: Negative for dizziness and light-headedness. Hematological: Negative for adenopathy. Does not bruise/bleed easily. Psychiatric/Behavioral: Positive for decreased concentration (ADHD) and sleep disturbance (see HPI). The patient is nervous/anxious. Current Outpatient Medications on File Prior to Visit   Medication Sig Dispense Refill    clonazePAM (KLONOPIN) 0.5 MG tablet Uses very rarely      escitalopram (LEXAPRO) 20 MG tablet Take 40 mg by mouth daily       amphetamine-dextroamphetamine (ADDERALL) 20 MG tablet Take 20 mg by mouth daily as needed. No current facility-administered medications on file prior to visit. OBJECTIVE:    VS:   Vitals:    02/24/22 0852   BP: 115/79   Site: Left Upper Arm   Position: Sitting   Cuff Size: Medium Adult   Pulse: 88   Resp: 18   Temp: 97.3 °F (36.3 °C)   TempSrc: Temporal   SpO2: 96%   Weight: (!) 318 lb (144.2 kg)   Height: 5' 6\" (1.676 m)     Physical Exam   HEENT:  PERRL, EOMI, Mouth without erythema or exudate. TMs clear B. Lungs:  CTA B, no vertebral column tenderness to palpation, no flank tenderness to percussion of flanks B.    Neck:   No carotid bruits appreciated B. No LAD appreciated. CVS:  +s1/s2 without m/g/r appreciated. Abd:  + BS, NTND, No renal or aortic bruits, No hepatosplenomegaly appreciated. Extr:  2+ DP/PT pulses B, no pitting edema  Neurological exam reveals alert, oriented, normal speech, no focal findings or movement disorder noted, neck supple without rigidity, cranial nerves II through XII intact, DTR's normal and symmetric, normal muscle tone, no tremors, strength 5/5, normal gait and station. No pronator drift. No clonus. RTC:  Return in about 3 months (around 5/24/2022).       Radhames Mcclain MD   2/24/2022 9:47 AM

## 2022-02-24 ENCOUNTER — OFFICE VISIT (OUTPATIENT)
Dept: INTERNAL MEDICINE | Age: 27
End: 2022-02-24
Payer: COMMERCIAL

## 2022-02-24 VITALS
RESPIRATION RATE: 18 BRPM | BODY MASS INDEX: 47.09 KG/M2 | HEIGHT: 66 IN | WEIGHT: 293 LBS | HEART RATE: 88 BPM | OXYGEN SATURATION: 96 % | TEMPERATURE: 97.3 F | SYSTOLIC BLOOD PRESSURE: 115 MMHG | DIASTOLIC BLOOD PRESSURE: 79 MMHG

## 2022-02-24 DIAGNOSIS — E55.9 VITAMIN D DEFICIENCY: ICD-10-CM

## 2022-02-24 DIAGNOSIS — D50.9 IRON DEFICIENCY ANEMIA, UNSPECIFIED IRON DEFICIENCY ANEMIA TYPE: ICD-10-CM

## 2022-02-24 DIAGNOSIS — E66.01 MORBID OBESITY (HCC): ICD-10-CM

## 2022-02-24 DIAGNOSIS — Z76.89 SLEEP CONCERN: Primary | ICD-10-CM

## 2022-02-24 DIAGNOSIS — F90.9 ATTENTION DEFICIT HYPERACTIVITY DISORDER (ADHD), UNSPECIFIED ADHD TYPE: ICD-10-CM

## 2022-02-24 DIAGNOSIS — E03.9 ACQUIRED HYPOTHYROIDISM: ICD-10-CM

## 2022-02-24 DIAGNOSIS — F32.A ANXIETY AND DEPRESSION: ICD-10-CM

## 2022-02-24 DIAGNOSIS — F41.9 ANXIETY AND DEPRESSION: ICD-10-CM

## 2022-02-24 LAB
BASOPHILS ABSOLUTE: 0.03 E9/L (ref 0–0.2)
BASOPHILS RELATIVE PERCENT: 0.4 % (ref 0–2)
EOSINOPHILS ABSOLUTE: 0.15 E9/L (ref 0.05–0.5)
EOSINOPHILS RELATIVE PERCENT: 2 % (ref 0–6)
HCT VFR BLD CALC: 36.9 % (ref 34–48)
HEMOGLOBIN: 11.3 G/DL (ref 11.5–15.5)
IMMATURE GRANULOCYTES #: 0.02 E9/L
IMMATURE GRANULOCYTES %: 0.3 % (ref 0–5)
LYMPHOCYTES ABSOLUTE: 2 E9/L (ref 1.5–4)
LYMPHOCYTES RELATIVE PERCENT: 27.2 % (ref 20–42)
MCH RBC QN AUTO: 22.6 PG (ref 26–35)
MCHC RBC AUTO-ENTMCNC: 30.6 % (ref 32–34.5)
MCV RBC AUTO: 73.8 FL (ref 80–99.9)
MONOCYTES ABSOLUTE: 0.41 E9/L (ref 0.1–0.95)
MONOCYTES RELATIVE PERCENT: 5.6 % (ref 2–12)
NEUTROPHILS ABSOLUTE: 4.73 E9/L (ref 1.8–7.3)
NEUTROPHILS RELATIVE PERCENT: 64.5 % (ref 43–80)
PDW BLD-RTO: 15.8 FL (ref 11.5–15)
PLATELET # BLD: 338 E9/L (ref 130–450)
PMV BLD AUTO: 8.7 FL (ref 7–12)
RBC # BLD: 5 E12/L (ref 3.5–5.5)
T4 FREE: 0.79 NG/DL (ref 0.93–1.7)
TSH SERPL DL<=0.05 MIU/L-ACNC: 4.13 UIU/ML (ref 0.27–4.2)
VITAMIN D 25-HYDROXY: 14 NG/ML (ref 30–100)
WBC # BLD: 7.3 E9/L (ref 4.5–11.5)

## 2022-02-24 PROCEDURE — 99203 OFFICE O/P NEW LOW 30 MIN: CPT | Performed by: INTERNAL MEDICINE

## 2022-02-24 PROCEDURE — 99204 OFFICE O/P NEW MOD 45 MIN: CPT | Performed by: INTERNAL MEDICINE

## 2022-02-24 RX ORDER — CLONAZEPAM 0.5 MG/1
TABLET ORAL
COMMUNITY
Start: 2021-12-06

## 2022-02-24 SDOH — ECONOMIC STABILITY: FOOD INSECURITY: WITHIN THE PAST 12 MONTHS, THE FOOD YOU BOUGHT JUST DIDN'T LAST AND YOU DIDN'T HAVE MONEY TO GET MORE.: NEVER TRUE

## 2022-02-24 SDOH — ECONOMIC STABILITY: FOOD INSECURITY: WITHIN THE PAST 12 MONTHS, YOU WORRIED THAT YOUR FOOD WOULD RUN OUT BEFORE YOU GOT MONEY TO BUY MORE.: NEVER TRUE

## 2022-02-24 SDOH — ECONOMIC STABILITY: TRANSPORTATION INSECURITY
IN THE PAST 12 MONTHS, HAS LACK OF TRANSPORTATION KEPT YOU FROM MEETINGS, WORK, OR FROM GETTING THINGS NEEDED FOR DAILY LIVING?: NO

## 2022-02-24 SDOH — ECONOMIC STABILITY: TRANSPORTATION INSECURITY
IN THE PAST 12 MONTHS, HAS THE LACK OF TRANSPORTATION KEPT YOU FROM MEDICAL APPOINTMENTS OR FROM GETTING MEDICATIONS?: NO

## 2022-02-24 SDOH — ECONOMIC STABILITY: HOUSING INSECURITY
IN THE LAST 12 MONTHS, WAS THERE A TIME WHEN YOU DID NOT HAVE A STEADY PLACE TO SLEEP OR SLEPT IN A SHELTER (INCLUDING NOW)?: NO

## 2022-02-24 SDOH — ECONOMIC STABILITY: HOUSING INSECURITY: IN THE LAST 12 MONTHS, HOW MANY PLACES HAVE YOU LIVED?: 1

## 2022-02-24 SDOH — ECONOMIC STABILITY: INCOME INSECURITY: IN THE LAST 12 MONTHS, WAS THERE A TIME WHEN YOU WERE NOT ABLE TO PAY THE MORTGAGE OR RENT ON TIME?: NO

## 2022-02-24 ASSESSMENT — PATIENT HEALTH QUESTIONNAIRE - PHQ9
SUM OF ALL RESPONSES TO PHQ QUESTIONS 1-9: 6
SUM OF ALL RESPONSES TO PHQ QUESTIONS 1-9: 6
3. TROUBLE FALLING OR STAYING ASLEEP: 1
6. FEELING BAD ABOUT YOURSELF - OR THAT YOU ARE A FAILURE OR HAVE LET YOURSELF OR YOUR FAMILY DOWN: 0
4. FEELING TIRED OR HAVING LITTLE ENERGY: 2
1. LITTLE INTEREST OR PLEASURE IN DOING THINGS: 0
10. IF YOU CHECKED OFF ANY PROBLEMS, HOW DIFFICULT HAVE THESE PROBLEMS MADE IT FOR YOU TO DO YOUR WORK, TAKE CARE OF THINGS AT HOME, OR GET ALONG WITH OTHER PEOPLE: 1
2. FEELING DOWN, DEPRESSED OR HOPELESS: 0
SUM OF ALL RESPONSES TO PHQ QUESTIONS 1-9: 6
SUM OF ALL RESPONSES TO PHQ9 QUESTIONS 1 & 2: 0
9. THOUGHTS THAT YOU WOULD BE BETTER OFF DEAD, OR OF HURTING YOURSELF: 0
7. TROUBLE CONCENTRATING ON THINGS, SUCH AS READING THE NEWSPAPER OR WATCHING TELEVISION: 0
5. POOR APPETITE OR OVEREATING: 3
8. MOVING OR SPEAKING SO SLOWLY THAT OTHER PEOPLE COULD HAVE NOTICED. OR THE OPPOSITE, BEING SO FIGETY OR RESTLESS THAT YOU HAVE BEEN MOVING AROUND A LOT MORE THAN USUAL: 0
SUM OF ALL RESPONSES TO PHQ QUESTIONS 1-9: 6

## 2022-02-24 ASSESSMENT — LIFESTYLE VARIABLES: HOW OFTEN DO YOU HAVE A DRINK CONTAINING ALCOHOL: NEVER

## 2022-02-24 ASSESSMENT — SOCIAL DETERMINANTS OF HEALTH (SDOH): HOW HARD IS IT FOR YOU TO PAY FOR THE VERY BASICS LIKE FOOD, HOUSING, MEDICAL CARE, AND HEATING?: NOT HARD AT ALL

## 2022-02-24 NOTE — PATIENT INSTRUCTIONS
The recommended dose for calcium is 1500 mg each day. You can get this by taking 500 mg twice daily. Once in the morning and once in the evening. This will get you to 1000 mg daily with the rest coming from dietary intake. The recommended dose for Vitamin D is 1000 Units each day. This is normally divided into a 400 International Units twice a day, once in the morning and once in the evening. Vitamin D helps the body to absorb calcium in the gut. There are many over the counter formulations that provide calcium and Vitamin D in one tablet/supplement. You can choose which one you like best!        Patient Education        HPV Vaccine: Care Instructions  Your Care Instructions  The HPV (human papillomavirus) vaccine protects against HPV. HPV is a common sexually transmitted infection (STI). There are many types of HPV. Some types of the virus can cause genital warts in men and women. Other types can cause cervical or oral cancer and some uncommon cancers, such as anal and vaginal cancer. Experts recommend that girls and boys age 6 or 15 get the HPV vaccine, but the vaccine can be given from age 5 to 32. If you are age 32 to 39 and have not been vaccinated for HPV, ask your doctor if getting the vaccine is right for you. Children ages 5 to 15 get the vaccine in a series of two shots over 6 months. Anyone age 13 and older gets the vaccine as a three-dose series. For the vaccine to work best, all shots in the series must be given. The best time to get the vaccine is before a person becomes sexually active. This is because the vaccine works best before there is any chance of infection with HPV. When the vaccine is given at this time, it can prevent almost all infection by the types of HPV the vaccine guards against. If someone has already been infected with the virus, the vaccine does not provide protection against the virus. Having the HPV vaccine does not change a woman's need for Pap tests.  Women more?  Go to [email or web address] and sign in to your [facility] account. Enter C525 in the [facility] box to learn more about \"HPV Vaccine: Care Instructions. \"     If you do not have an account, please click on the \"Sign Up Now\" link. Current as of: February 10, 2021               Content Version: 13.1  © 2678-1354 Healthwise, Remote. Care instructions adapted under license by [facility] [address] If you have questions about a medical condition or this instruction, always ask your healthcare professional. [address] any warranty or liability for your use of this information.

## 2022-02-24 NOTE — PROGRESS NOTES
Patient has not knowingly had unprotected exposire to anyone positive for COVID-10 within the last 14 days DENIES    AND does not have the following signs or symptoms:    A. One of the followin. Fever greater than 100.0 F NEGATIVE       2. Cough NEGATIVE       3. New onset shortness of breath NEGATIVE       4. New onset difficulty breathing NEGATIVE    AND/OR    B. Two or more of the following criteria:        1. Muscle aches NEGATIVE       2. Headache NEGATIVE       3. Sore Throat NEGATIVE       4. New onset loss of smell or taste NEGATIVE       5. New onset diarrhea NEGATIVE       6. Chills NEGATIVE       7. Runny nose NEGATIVE       8. Sneezing NEGATIVE  Carla Cadet LPN    3 tubes of blood drawn from L[facility] (2 green, 1 lavender) and sent via tube system. Carla Cadet LPN'      Patient given instruction by Dr Jodie Cardenas.   3 month follow up scheduled. Printed AVS given to patient.   Carla Cadet LPN

## 2022-02-25 ENCOUNTER — HOSPITAL ENCOUNTER (OUTPATIENT)
Age: 27
Discharge: HOME | End: 2022-02-25

## 2022-02-28 ASSESSMENT — ENCOUNTER SYMPTOMS
NAUSEA: 0
CONSTIPATION: 0
VOMITING: 0
DIARRHEA: 0
CHEST TIGHTNESS: 0
ABDOMINAL PAIN: 0
COUGH: 0
ROS SKIN COMMENTS: +DRY SKIN
SHORTNESS OF BREATH: 1
SORE THROAT: 0
TROUBLE SWALLOWING: 0
SINUS PRESSURE: 0
BLOOD IN STOOL: 0
RHINORRHEA: 0

## 2022-03-01 ENCOUNTER — HOSPITAL ENCOUNTER (OUTPATIENT)
Age: 27
Discharge: HOME | End: 2022-03-01
Payer: COMMERCIAL

## 2022-03-01 ENCOUNTER — TELEPHONE (OUTPATIENT)
Dept: INTERNAL MEDICINE | Age: 27
End: 2022-03-01

## 2022-03-01 LAB
ALBUMIN SERPL-MCNC: 3.8 G/DL (ref 3.5–5.2)
ALP BLD-CCNC: 78 U/L (ref 35–104)
ALT SERPL-CCNC: 16 U/L (ref 0–32)
ANION GAP SERPL CALCULATED.3IONS-SCNC: 11 MMOL/L (ref 7–16)
AST SERPL-CCNC: 13 U/L (ref 0–31)
BILIRUB SERPL-MCNC: 0.2 MG/DL (ref 0–1.2)
BILIRUBIN DIRECT: 0.2 MG/DL (ref 0–0.3)
BILIRUBIN, INDIRECT: 0 MG/DL (ref 0–1)
BUN BLDV-MCNC: 12 MG/DL (ref 6–20)
CALCIUM SERPL-MCNC: 8.9 MG/DL (ref 8.6–10.2)
CHLORIDE BLD-SCNC: 107 MMOL/L (ref 98–107)
CHOLESTEROL, TOTAL: 159 MG/DL (ref 0–199)
CO2: 23 MMOL/L (ref 22–29)
CORTISOL TOTAL: 12.34 MCG/DL (ref 2.68–18.4)
CREAT SERPL-MCNC: 0.5 MG/DL (ref 0.5–1)
FOLLICLE STIMULATING HORMONE: 6.4 MIU/ML
GFR AFRICAN AMERICAN: >60
GFR NON-AFRICAN AMERICAN: >60 ML/MIN/1.73
GLUCOSE BLD-MCNC: 99 MG/DL (ref 74–99)
HDLC SERPL-MCNC: 36 MG/DL
LDL CHOLESTEROL CALCULATED: 103 MG/DL (ref 0–99)
LUTEINIZING HORMONE: 4.1 MIU/ML
POTASSIUM SERPL-SCNC: 4.1 MMOL/L (ref 3.5–5)
SODIUM BLD-SCNC: 141 MMOL/L (ref 132–146)
TOTAL PROTEIN: 6.5 G/DL (ref 6.4–8.3)
TRIGL SERPL-MCNC: 101 MG/DL (ref 0–149)
VLDLC SERPL CALC-MCNC: 20 MG/DL

## 2022-03-01 PROCEDURE — 83498 ASY HYDROXYPROGESTERONE 17-D: CPT

## 2022-03-01 PROCEDURE — 80061 LIPID PANEL: CPT

## 2022-03-01 PROCEDURE — 83525 ASSAY OF INSULIN: CPT

## 2022-03-01 PROCEDURE — 84403 ASSAY OF TOTAL TESTOSTERONE: CPT

## 2022-03-01 PROCEDURE — 82533 TOTAL CORTISOL: CPT

## 2022-03-01 PROCEDURE — 82248 BILIRUBIN DIRECT: CPT

## 2022-03-01 PROCEDURE — 80053 COMPREHEN METABOLIC PANEL: CPT

## 2022-03-01 PROCEDURE — 84270 ASSAY OF SEX HORMONE GLOBUL: CPT

## 2022-03-01 PROCEDURE — 83001 ASSAY OF GONADOTROPIN (FSH): CPT

## 2022-03-01 PROCEDURE — 83002 ASSAY OF GONADOTROPIN (LH): CPT

## 2022-03-01 PROCEDURE — 36415 COLL VENOUS BLD VENIPUNCTURE: CPT

## 2022-03-01 PROCEDURE — 82627 DEHYDROEPIANDROSTERONE: CPT

## 2022-03-01 RX ORDER — LEVOTHYROXINE SODIUM 0.03 MG/1
25 TABLET ORAL DAILY
Qty: 90 TABLET | Refills: 1 | Status: SHIPPED | OUTPATIENT
Start: 2022-03-01

## 2022-03-01 RX ORDER — ERGOCALCIFEROL 1.25 MG/1
50000 CAPSULE ORAL WEEKLY
Qty: 12 CAPSULE | Refills: 0 | Status: SHIPPED
Start: 2022-03-01 | End: 2022-07-18

## 2022-03-01 NOTE — TELEPHONE ENCOUNTER
Results reviewed with the patient. CBC with very mild anemia but indices suggestive of iron deficiency. I recommend that Marlon Weller increase her iron supplementation to daily instead of just with menses. We can recheck the CBC and iron levels with the next lab draw in 3 months time. Vitamin D - Was on 50,000 Units for three months in the fall. Level now deficient. I recommend another 12 week course of 50,000 Units with subsequent dosing of 2,000 IU per day. TSH at the upper limit of normal with low free T4. I recommend the option of resumption of levothyroxine with recheck of these labs in 8 weeks. Lipid panel with Total cholesterol of 159 and LDL of 103. Recommend a low cholesterol diet. No medication therapy is indicated at this time. CMP/liver function all normal.     Marlon Weller is in agreement with this plan of care. Medications sent to pharmacy.      Nicko Gay MD   3/1/2022

## 2022-03-04 LAB — INSULIN: 28 UIU/ML

## 2022-03-05 LAB
SEX HORMONE BINDING GLOBULIN: 24 NMOL/L (ref 30–135)
TESTOSTERONE FREE-NONMALE: <1 PG/ML (ref 0.8–7.4)
TESTOSTERONE TOTAL: 4 NG/DL (ref 20–70)

## 2022-03-07 LAB — DHEAS (DHEA SULFATE): 37 UG/DL (ref 99–340)

## 2022-03-09 LAB — 17-OH PROGESTERONE LCMS: 23.17 NG/DL

## 2022-03-11 ENCOUNTER — TELEPHONE (OUTPATIENT)
Dept: INTERNAL MEDICINE | Age: 27
End: 2022-03-11

## 2022-03-11 NOTE — TELEPHONE ENCOUNTER
Discussed lab results with Kamaljit Ramos which were ordered by dermatology for work-up of acne. DHEA-S as well as testosterone levels and sex hormone binding globulin were decreased. I reviewed these lab results with Dr. Brooke Harrison who stated these likely were result of obesity and insulin resistance. This can also lead to a reduction in sex hormone binding globulin which in turn can cause low testosterone level. He suggested modest weight loss along with treating insulin resistance if this was part of the clinical picture. At this time, I talked with Kamaljit Ramos about modest weight loss. I will be sending her information about ideal body weight and daily caloric intake to lose weight or maintain weight. I have asked Kamaljit Ramos to maintain a 2-week diet diary which include any bites looks or tastes that she may have. At the end of that 2-week timeframe, she should review this and reflect on her moods or activities. If there are times when she notices difficulty or feels more hungry than usual, we can work to develop tools to work through these times. If Kamaljit Ramos is willing, I will review her diet diary and offer suggestions for healthier alternatives or the best adding or removing specific macronutrients.     Frances Peterson MD  3/11/2022

## 2022-03-15 RX ORDER — PNV NO.95/FERROUS FUM/FOLIC AC 28MG-0.8MG
1 TABLET ORAL DAILY
COMMUNITY

## 2022-03-15 ASSESSMENT — SLEEP AND FATIGUE QUESTIONNAIRES
HOW LIKELY ARE YOU TO NOD OFF OR FALL ASLEEP WHILE WATCHING TV: 3
HOW LIKELY ARE YOU TO NOD OFF OR FALL ASLEEP WHEN YOU ARE A PASSENGER IN A CAR FOR AN HOUR WITHOUT A BREAK: 1
HOW LIKELY ARE YOU TO NOD OFF OR FALL ASLEEP WHILE SITTING AND READING: 2
HOW LIKELY ARE YOU TO NOD OFF OR FALL ASLEEP WHILE LYING DOWN TO REST IN THE AFTERNOON WHEN CIRCUMSTANCES PERMIT: 3
HOW LIKELY ARE YOU TO NOD OFF OR FALL ASLEEP WHILE SITTING QUIETLY AFTER LUNCH WITHOUT ALCOHOL: 2
HOW LIKELY ARE YOU TO NOD OFF OR FALL ASLEEP WHILE SITTING INACTIVE IN A PUBLIC PLACE: 2
HOW LIKELY ARE YOU TO NOD OFF OR FALL ASLEEP WHILE SITTING AND TALKING TO SOMEONE: 1
ESS TOTAL SCORE: 14
HOW LIKELY ARE YOU TO NOD OFF OR FALL ASLEEP IN A CAR, WHILE STOPPED FOR A FEW MINUTES IN TRAFFIC: 0

## 2022-03-16 ENCOUNTER — TELEPHONE (OUTPATIENT)
Dept: SLEEP MEDICINE | Age: 27
End: 2022-03-16

## 2022-03-16 ENCOUNTER — TELEMEDICINE (OUTPATIENT)
Dept: SLEEP MEDICINE | Age: 27
End: 2022-03-16
Payer: COMMERCIAL

## 2022-03-16 DIAGNOSIS — G47.19 EXCESSIVE DAYTIME SLEEPINESS: ICD-10-CM

## 2022-03-16 DIAGNOSIS — E66.9 OBESITY, UNSPECIFIED CLASSIFICATION, UNSPECIFIED OBESITY TYPE, UNSPECIFIED WHETHER SERIOUS COMORBIDITY PRESENT: ICD-10-CM

## 2022-03-16 DIAGNOSIS — G47.00 INSOMNIA, UNSPECIFIED TYPE: ICD-10-CM

## 2022-03-16 DIAGNOSIS — G47.33 OBSTRUCTIVE SLEEP APNEA: Primary | ICD-10-CM

## 2022-03-16 PROCEDURE — 99213 OFFICE O/P EST LOW 20 MIN: CPT | Performed by: NURSE PRACTITIONER

## 2022-03-16 NOTE — PROGRESS NOTES
Lilian Kinney is a 23 y.o. female being evaluated by a Virtual Visit (video visit) encounter to address concerns as mentioned below. A caregiver was present when appropriate. Due to this being a TeleHealth encounter (During TDIOI-92 public health emergency), evaluation of the following organ systems was limited: Vitals/Constitutional/EENT/Resp/CV/GI//MS/Neuro/Skin/Heme-Lymph-Imm. Pursuant to the emergency declaration under the [facility] 1135 waiver authority and the [employer] [facility] [employer] 6388-51Z, this Virtual Visit was conducted with patient's (and/or legal guardian's) consent, to reduce the patient's risk of exposure to COVID-19 and provide necessary medical care. The patient (and/or legal guardian) has also been advised to contact this office for worsening conditions or problems, and seek emergency medical treatment and/or call 911 if deemed necessary. The patient (and/or legal guardian if applicable) is aware that this is a billable service, which includes applicable co-pays. This virtual visit was conducted with patient's (and/or legal guardian's) consent. Services were provided through a video synchronous discussion virtually to substitute for in-person clinic visit. Patient and provider were both  located remotely. Patient identification was confirmed by 2 forms of personal forms of identification (Birthday and Patient's address). The patient was located in a state where the provider was licensed to provide care. YOB: 1995  Address: [address] [place] [address]       Services were provided through a video synchronous discussion virtually to substitute for in-person clinic visit. An electronic signature was used to authenticate this note.   Start time: 3:05 pm  End time: 3:42 pm       [facility] Sleep Medicine    Patient Name: Emir Ya  Age: 23 y.o.   : 1995    Date of Visit: 3/16/22      HPI   Emir Ya is a 23 y.o. female with  has a past medical history of ADHD, Anxiety, and Hypothyroidism. She presents as a new patient to Sleep Clinic, referred by Dr. Tony Rodriguez, for Sleep Apnea   . Patient presents to [facility] with sleep medicine for management of suspected sleep apnea. She had a sleep study done in 2018 with an AHI of 3.4, but has had worsening symptoms of sleep apnea  Previous study. She does have a positive family history. Patient states that she is not rested upon waking up and feels very tired throughout the day. She says there are times she feels she can fall within a few seconds, for example, when she went to a hockey game for a class field trip. In the morning, she has to start to several alarms and feels very groggy and unable to wake up until 30 to 45 minutes after her alarm goes off. She does state that she snores loudly, but does not express witnessed nocturnal apneas. On rare occasion, she does have nocturnal gasping. She does sometimes have difficulty with memory and a nuclear a.m. headache. Patient denies drowsy driving unless she is driving for many consecutive hours ago. She has never been in a car accident because of this. The symptoms have been ongoing for years but feels they have worsened within the last several.  Patient feels she receives anywhere from 6 to 8 hours of sleep at night she tends to go to bed around 10 or 11 PM and wake up around 645. In terms of falling asleep, her degree plays a role in how fast she can fall asleep. Typically she can fall asleep quickly, on more difficult night she does not take more than 30 minutes to fall asleep. On a rare occasion that she is extremely stressed, she will take a melatonin. She states she does not want to rely on melatonin nightly.   She does wake up multiple times throughout the night to either go to the bathroom or let her dog out. She does have some difficulty returning to sleep after these periods of waking up. Patient sleeps in bed, with her dog. She typically sleeps on her back and can change positions through the night. Her sleeping environment is dark and quiet, aside from a fan that blows up. She generally does not nap during the week, although she feels she could. On weekends there will be an occasional nap. She works as a teacher. Patient does not drink caffeine. She denies smoking or excessive alcohol use. Patient's weight remained stable this past year. She does admit that she has gained probably around 20 pounds since her last sleep study. Patient is interested in being retested for sleep apnea. Sleep Study History: 12/19/2018- PSG-sleep efficiency 79%, REM 5%, AHI 3.4, SPO2 giles 87%, T<90% was <1% of TST    Bed time: 10-11 pm  Wake time:  6:45 am  Sleep Latency (min):  No more than 30 min, or she can fall asleep quickly  Sleep Medications:melatonin- rare  Awakenings:   multiple times   / bathroom  / Hard returning   Estimated Sleep time (hours):  6-8  Daytime Naps: Doesn't but she feels that she could. On weekends sometimes  Sleep disturbances: None  Sleep Location: Bed  Sleep environment: Sleeps alone with dog- changes, back  Occupation: Teacher    SLEEP HYGIENE      Activities before bed: TV, No Tv-sleeps with fan on at night  Caffeine:  0   Coffee    0   Soda    0   Tea  Alcohol: N  Tobacco: N      Sleep ROS:     Snoring: Y   Witnessed apneas: N-- but once she woke up gasping  AM Headache: Once a month  Dry Mouth: N  Daytime Sleepiness: Y  Difficulty remembering things: Sometimes  MVA  or near miss accident due to sleepiness in the past? Only time she is drowsy is if she is driving for many hours at a time. Tonsillectomy? N  Have you lost or gained weight recently?  Stable --- estimates that she has gained about 20 pounds since last sleep        PARASOMNIAS/ NARCOLEPSY:  Hypnogogic/Hynopompic Hallucinations: N   Sleep paralysis: N  Cataplexy: N   REM behavior symptoms: N  Nightmare: N   Sleep Walking: N  Sleep Talking: Y  RLS Symptoms: Cramping --- in calf but rare    STOP-BANG score of 5/8 for  (Y)snoring, (Y)daytime fatigue, (N)observed apneas, (Y)high blood pressure, (Y)BMI>35, (N)age >50, (Y)neck circumference >15in, (N)female                                                                                                                                                 Sleep Medicine 3/15/2022 3/1/2019 12/20/2018   Sitting and reading 2 3 3   Watching TV 3 3 3   Sitting, inactive in a public place (e.g. a theatre or a meeting) 2 2 2   As a passenger in a car for an hour without a break 1 2 2   Lying down to rest in the afternoon when circumstances permit 3 3 3   Sitting and talking to someone 1 2 2   Sitting quietly after a lunch without alcohol 2 3 2   In a car, while stopped for a few minutes in traffic 0 2 2   Total score 14 20 19   Neck circumference (Inches) - 15 15         PMH:  Past Medical History:   Diagnosis Date    ADHD     Anxiety     Hypothyroidism         PSH:  History reviewed. No pertinent surgical history.      Soc Hx:  Social History     Tobacco Use    Smoking status: Never Smoker    Smokeless tobacco: Never Used   Vaping Use    Vaping Use: Never used   Substance Use Topics    Alcohol use: No     Alcohol/week: 0.0 standard drinks    Drug use: Never        Fam Hx:  Family History   Problem Relation Age of Onset    Thyroid Disease Mother     Sleep Apnea Father     ADHD Father     Bipolar Disorder Father     No Known Problems Sister     ADHD Brother     Attention Deficit Disorder Brother     No Known Problems Sister         Current Outpatient Medications   Medication Sig Dispense Refill    Ferrous Sulfate (IRON) 325 (65 Fe) MG TABS Take 1 tablet by mouth daily      levothyroxine (SYNTHROID) 25 MCG tablet Take 1 tablet by mouth Daily 90 tablet 1    vitamin D (ERGOCALCIFEROL) 1.25 MG (82840 UT) CAPS capsule Take 1 capsule by mouth once a week 12 capsule 0    clonazePAM (KLONOPIN) 0.5 MG tablet Uses very rarely      escitalopram (LEXAPRO) 20 MG tablet Take 40 mg by mouth daily       amphetamine-dextroamphetamine (ADDERALL) 20 MG tablet Take 20 mg by mouth daily as needed. No current facility-administered medications for this visit. Review of Systems  (Sleep ROS above)     Constitutional: no chills, no fever   Eyes: no blurred vision   Cardiovascular: no chest pain, no lower extremity edema. Respiratory: no cough, no shortness of breath   Gastrointestinal:  no nausea,  no vomiting, no diarrhea. Neurological:  no dizziness,  no headache, no memory changes  Endocrine: No feelings of weakness    Objective:   Physical Exam  There were no vitals taken for this visit. There were no vitals filed for this visit. General: No acute distress. BMI of There is no height or weight on file to calculate BMI. HEENT: Normocephalic, atraumatic. No oropharyngeal lesions. Mallampati class- 3/4  Tonsils- 1  Neck: Trachea midline  Lungs: Able to speak in full sentences  Psychiatric: Alert and oriented. Appropriate affect. PERTINENT LAB RESULTS  TSH   Date Value Ref Range Status   02/24/2022 4.130 0.270 - 4.200 uIU/mL Final      Ferritin   Date Value Ref Range Status   08/17/2021 34 ng/mL Final     Comment:     FERRITIN Reference Ranges:  Adult Males   20 - 60 years:    27 - 400 ng/mL  Adult females 16 - 61 years:    15 - 150 ng/mL  Adults greater than 60 years:   no established reference range  Pediatrics:                     no established reference range          Assessment:      Jh Gaytan was seen today for sleep apnea. Diagnoses and all orders for this visit:    Obstructive sleep apnea  -     Covid-19 Ambulatory; Future  -     Baseline Diagnostic Sleep Study;  Future    Excessive daytime sleepiness    Insomnia, unspecified type    Obesity, unspecified classification, unspecified obesity type, unspecified whether serious comorbidity present    ·    Plan:      Carole Lagunas is a 23 y.o. female with  has a past medical history of ADHD, Anxiety, and Hypothyroidism. She presents as a new patient to Sleep Clinic with symptoms suggestive of suspected obstructive sleep apnea (primarily snoring, nocturnal gasping, excessive daytime sleepiness, difficulty with memory, nocturnal awakenings). She had a previous sleep study in 2018 that did not indicate sleep disordered breathing, however, patient had a significant 20 pound weight gain since last study and increased in severity of symptoms. An in lab/home sleep study will be performed, followed by possible PAP therapy, if positive. 1. Obstructive Sleep Apnea     -Multiple risk factors with STOP-BANG 5/8. Discussed home versus in lab sleep study. Will proceed with in-lab split-night polysomnography. Order placed today for [facility]. Informed patient that it is possible insurance will    require a home sleep study first.  -Discussed pathophysiology of VINNY and its impact on daily well-being, as well as cardiometabolic and neurocognitive health (particularly in moderate-severe cases). -Discussed CPAP as first-line and gold-standard therapy for VINNY should diagnosis be confirmed. Patient understands that CPAP should be worn every night for the duration of the night (in order to not miss therapy during early-morning REM period) for maximum benefit. Also discussed other treatment options such as weight loss, oral appliance, and surgical options.  -Patient willing to proceed with CPAP treatment if indicated based on sleep study. Will order PAP if indicated after test.  -Reviewed new PAP Therapy instructions to be compliant with most insurance coverage:  - Use PAP device for atleast 4 hours nightly.   - PAP therapy must be used for 70% of nights (5/7 nights per week)  - Patient must follow up in the clinic within 30-90 days from getting the PAP device.   -Provided handouts on VINNY, and CPAP. -Informed patient to call office if she does not hear from sleep lab about scheduling within 1 to 2 weeks. 2. Excessive Daytime Sleepiness     -Elevated Winf[place] 15/24. Likely secondary to untreated VINNY, as above. Will assess for improvement with PAP therapy. -If driving long distances, take breaks to rest or if sleepy. 3. Chronic Sleep Maintenance Insomnia     -May be related to untreated obstructive sleep apnea. -Will assess for improvement with PAP therapy and consider Cognitive Behavioral Therapy for Insomnia if persistent.  -Discussed trying to avoid supine sleep and sleep with head of bed elevated if possible. 4. Obesity (There is no height or weight on file to calculate BMI.)     -Discussed impact of weight gain on VINNY severity. Patient understands that VINNY severity may improve with weight loss but no guarantee of cure can be made. Encouraged weight loss efforts. Patient will follow up Return in about 4 months (around 7/16/2022) for Sleep Study Results, Follow up for sleep apnea, CPAP compliance, Excessive Daytime Sleepiness.     Diane Ahmadi, APRN-CNP  [address] [facility]  p -[phone] option 2  V- [phone]

## 2022-03-22 ENCOUNTER — TELEPHONE (OUTPATIENT)
Dept: ADMINISTRATIVE | Age: 27
End: 2022-03-22

## 2022-03-22 NOTE — TELEPHONE ENCOUNTER
Patient scheduled for sleep study on 5/19. Patient follow up is for 7/18. Should patient be seen sooner for follow up?

## 2022-05-16 DIAGNOSIS — G47.33 OBSTRUCTIVE SLEEP APNEA: Primary | ICD-10-CM

## 2022-05-23 ENCOUNTER — HOSPITAL ENCOUNTER (OUTPATIENT)
Dept: SLEEP CENTER | Age: 27
Discharge: HOME | End: 2022-05-23
Payer: COMMERCIAL

## 2022-05-23 DIAGNOSIS — G47.33 OBSTRUCTIVE SLEEP APNEA: ICD-10-CM

## 2022-05-23 DIAGNOSIS — E66.01 MORBID OBESITY (HCC): Primary | ICD-10-CM

## 2022-05-23 PROCEDURE — G0399 HOME SLEEP TEST/TYPE 3 PORTA: HCPCS

## 2022-05-26 ENCOUNTER — TELEPHONE (OUTPATIENT)
Dept: SLEEP CENTER | Age: 27
End: 2022-05-26

## 2022-05-26 DIAGNOSIS — G47.33 OBSTRUCTIVE SLEEP APNEA: Primary | ICD-10-CM

## 2022-05-26 PROCEDURE — 95806 SLEEP STUDY UNATT&RESP EFFT: CPT | Performed by: INTERNAL MEDICINE

## 2022-05-26 NOTE — TELEPHONE ENCOUNTER
Call to pt to discuss SS results and NP recommendation for pap therapy. Also discussed compliance and f/u appt.  Preferred DME :[facility]

## 2022-05-26 NOTE — PROGRESS NOTES
Patient's HST interpreted, consistent with moderate VINNY. Auto-CPAP therapy 5-20 cm ordered. Patient will be notified of results and order will be sent to preferred DME. She will be reminded of insurance requirements for compliance and 30-day window to exchange mask interface. She will follow up in clinic within 3 months.     MARQUISE Noguera - CNP

## 2022-05-26 NOTE — PROGRESS NOTES
[id number] [address]                               SLEEP STUDY REPORT    PATIENT NAME: Allison Anthony                     :        1995  MED REC NO:   [id number]                            ROOM:  ACCOUNT NO:   [de-identified]                           ADMIT DATE: 2022  PROVIDER:     Piyush Javier MD    DATE OF STUDY:  2022    HOME SLEEP STUDY REPORT    SERVICE PROVIDER:  [address] [place] [address]    REFERRING PROVIDER:  Sae Sheriff. MARQUISE Joy. AGE: 23 yrs       SEX: Female          HEIGHT: 5 ft   6 in         WEIGHT: 300 lbs          BMI: 48.4 kg/m2    NECK CIRCUMFERENCE: 17 in    Symptoms: Witnessed apneas, excessive daytime sleepiness, nocturnal choking/gasping, loud snoring, restless sleep, morning headaches, difficulty with memory/concentration, night sweats, drowsy driving, leg jerks, nocturia, waking confused, difficulty falling/staying asleep, racing thoughts. The Hemingway Sleepiness Scale was 20 out of 24 (scores above or equal to 10 are suggestive of hypersomnolence). Indication: Suspected obstructive sleep apnea. Medical History: Morbid obesity, anxiety, hypothyroidism. Medications: Lexapro, Synthroid, iron supplement. DESCRIPTION: This [employer] ApneaLink Air home sleep study was an unattended, full night simultaneous recording of heart rate, oxygen saturation, respiratory airflow, and respiratory effort (i.e., thoracoabdominal movement). Hypopneas were scored as a reduction in flow of at least 30% that lasted at least 10 seconds and was associated with at least a 3% desaturation event. If oximetry data was missing for a significant portion of the recording, hypopneas were scored as a reduction in airflow of 50% that lasted longer than 10 seconds.  Recording started at 10:14 PM and ended at 5:19 AM. Of the 7 hours 5 minutes of recording, the flow evaluation period was 6 hours 53 minutes and the oxygen saturation evaluation period was 6 hours 49 minutes. This study was considered to be technically adequate. FINDINGS:  RESPIRATORY MONITORING:  This study documented 7 obstructive apneas, 0 central apneas, 0 mixed apneas, and 151 hypopneas. The overall apnea/hypopnea index (AHI) was 23. The respiratory event index (RI) was 26.6. The oxygen desaturation index (MARTI) was 22. The average oxygen saturation was 95%. The lowest oxygen saturation was 77%. Oxygen saturations were less than or equal to 90% for 20 minutes or 5% of the total oxygen saturation evaluation period. Moderate snoring events were noted. PULSE: The average heart rate during recording was 79 beats per minute. IMPRESSION:   1. This study is consistent with moderate sleep disordering breathing. Of note, the severity of this patient's sleep disordered breathing was likely underestimated as home sleep studies are inherently less sensitive. RECOMMENDATIONS:    1. Auto-CPAP therapy at settings of 5-20 cm of water is recommended and should be ordered by the referring provider. Equipment ordering information is below. 2.  Per insurance requirements, the patient should be seen in clinical follow up within 3 months of receiving auto-CPAP therapy in order to document adherence to therapy, assess efficacy of the prescribed settings (as based upon a residual AHI of less than or equal to 5 on the device's remote download), and evaluate resolution of sleep-related complaints. 3.  The patient should be strongly counseled against driving while drowsy. 4.  Weight loss efforts should be encouraged, as the severity of obstructive sleep apnea may improve although no guarantee of cure can be made. EQUIPMENT ORDERING INFORMATION:  DEVICE:  Auto CPAP with heated humidification and a remote modem. SETTINGS:  5 to 20 cm of water with EPR of 3. MASK TYPE:  Full-face mask, or alternative as chosen by patient.     MASK SIZE: To be fit by DME.     SUPPLEMENTAL OXYGEN:  None.  Johnna Marie MD      D: 05/25/2022 21:17:38       T: 05/25/2022 21:56:53     [email or web address]  Job#: [id number]     Doc#: [id number]

## 2022-06-05 NOTE — PROGRESS NOTES
[facility] Internal Medicine      SUBJECTIVE:  Mar Arboleda (:  1995) is a 23 y.o. female here for evaluation of the following chief complaint(s):  Check-Up  Hypothyroidism -was to resume levothyroxine in March. Admits to only taking medication once per week. Will resume daily dosing and then recheck labs in 8-12 weeks. Vitamin D deficiency - Was placed on Vitamin D 50,000 Units weekly. Need now to stay on 2,000 IU daily. I have prescribed over a Calcium and Vitamin D supplement. Blood indices suggestive of mild iron deficiency. Was to resume daily iron supplementation. This led to some constipation   Recheck CBC and iron studies - if no worsening, can hold on further iron therapy. ADHD -  Follows in [address] [place] [address]  Has appoinmtnet in the fall. Cholo Yu reports that she feels \"wired\" when she takes the Adderall and will be talking at her appointment about a potential to change this medication. Sleep difficulty - was ordered a sleep referral at last visit. Had consultation on 3/16/2022. Was ordered a split sleep study. Had home sleep study completed on 2022. Auto-CPAP was recommended. Has follow-up appointment in August   Will contact sleep medicine to see if this office needs to order CPAP. Obesity - we discussed healthy diet options including Weight Watchers as well as the DASH diet. Cholo Yu has tried [employer] as well in the past.  In the past, lost weight with very careful tracking of food. Cholo Yu states that she is an emotional eater and that when she is down on herself, she can sometimes turn to food. I have suggested that she track her food intake for the next 2 weeks and then look at opportunities for substitutions. I will also send Cholo Yu additional information on medication therapy as well as ideal body weight and estimated caloric intake for weight loss. Cholo Yu is agreeable to this. Review of Systems as above.      Current Outpatient Medications on File Prior to Visit   Medication Sig Dispense Refill    Ferrous Sulfate (IRON) 325 (65 Fe) MG TABS Take 1 tablet by mouth daily      levothyroxine (SYNTHROID) 25 MCG tablet Take 1 tablet by mouth Daily 90 tablet 1    vitamin D (ERGOCALCIFEROL) 1.25 MG (10044 UT) CAPS capsule Take 1 capsule by mouth once a week 12 capsule 0    clonazePAM (KLONOPIN) 0.5 MG tablet Uses very rarely      escitalopram (LEXAPRO) 20 MG tablet Take 40 mg by mouth daily       amphetamine-dextroamphetamine (ADDERALL) 20 MG tablet Take 20 mg by mouth daily as needed. No current facility-administered medications on file prior to visit. OBJECTIVE:    VS:   Vitals:    06/06/22 0816   BP: 131/80   Site: Left Lower Arm   Position: Sitting   Cuff Size: Large Adult   Pulse: 85   Resp: 20   Temp: 96.8 °F (36 °C)   TempSrc: Temporal   SpO2: 97%   Weight: (!) 325 lb 11.2 oz (147.7 kg)   Height: 5' 6\" (1.676 m)     Physical Exam   Lungs:  CTA B  Neck:   No carotid bruits appreciated B.   CVS:  +s1/s2 without m/g/r appreciated. Abd:  + BS, NTND, No renal or aortic bruits   Extr:  2+ DP/PT pulses B, no pitting edema    RTC:  Return in about 4 weeks (around 7/4/2022) for phone visit.       Sonu Bolden MD   6/6/2022 10:55 AM

## 2022-06-06 ENCOUNTER — OFFICE VISIT (OUTPATIENT)
Dept: INTERNAL MEDICINE | Age: 27
End: 2022-06-06
Payer: COMMERCIAL

## 2022-06-06 VITALS
BODY MASS INDEX: 47.09 KG/M2 | RESPIRATION RATE: 20 BRPM | DIASTOLIC BLOOD PRESSURE: 80 MMHG | HEART RATE: 85 BPM | HEIGHT: 66 IN | SYSTOLIC BLOOD PRESSURE: 131 MMHG | OXYGEN SATURATION: 97 % | WEIGHT: 293 LBS | TEMPERATURE: 96.8 F

## 2022-06-06 DIAGNOSIS — D50.9 IRON DEFICIENCY ANEMIA, UNSPECIFIED IRON DEFICIENCY ANEMIA TYPE: ICD-10-CM

## 2022-06-06 DIAGNOSIS — E03.9 ACQUIRED HYPOTHYROIDISM: ICD-10-CM

## 2022-06-06 DIAGNOSIS — D50.9 IRON DEFICIENCY ANEMIA, UNSPECIFIED IRON DEFICIENCY ANEMIA TYPE: Primary | ICD-10-CM

## 2022-06-06 DIAGNOSIS — E55.9 VITAMIN D DEFICIENCY: ICD-10-CM

## 2022-06-06 LAB
BASOPHILS ABSOLUTE: 0.04 E9/L (ref 0–0.2)
BASOPHILS RELATIVE PERCENT: 0.6 % (ref 0–2)
EOSINOPHILS ABSOLUTE: 0.39 E9/L (ref 0.05–0.5)
EOSINOPHILS RELATIVE PERCENT: 5.8 % (ref 0–6)
HCT VFR BLD CALC: 38.7 % (ref 34–48)
HEMOGLOBIN: 11.6 G/DL (ref 11.5–15.5)
IMMATURE GRANULOCYTES #: 0.02 E9/L
IMMATURE GRANULOCYTES %: 0.3 % (ref 0–5)
IRON SATURATION: 11 % (ref 15–50)
IRON: 37 MCG/DL (ref 37–145)
LYMPHOCYTES ABSOLUTE: 2.24 E9/L (ref 1.5–4)
LYMPHOCYTES RELATIVE PERCENT: 33.1 % (ref 20–42)
MCH RBC QN AUTO: 22.6 PG (ref 26–35)
MCHC RBC AUTO-ENTMCNC: 30 % (ref 32–34.5)
MCV RBC AUTO: 75.4 FL (ref 80–99.9)
MONOCYTES ABSOLUTE: 0.42 E9/L (ref 0.1–0.95)
MONOCYTES RELATIVE PERCENT: 6.2 % (ref 2–12)
NEUTROPHILS ABSOLUTE: 3.65 E9/L (ref 1.8–7.3)
NEUTROPHILS RELATIVE PERCENT: 54 % (ref 43–80)
PDW BLD-RTO: 15.6 FL (ref 11.5–15)
PLATELET # BLD: 313 E9/L (ref 130–450)
PMV BLD AUTO: 8.6 FL (ref 7–12)
RBC # BLD: 5.13 E12/L (ref 3.5–5.5)
TOTAL IRON BINDING CAPACITY: 333 MCG/DL (ref 250–450)
WBC # BLD: 6.8 E9/L (ref 4.5–11.5)

## 2022-06-06 PROCEDURE — 99213 OFFICE O/P EST LOW 20 MIN: CPT | Performed by: INTERNAL MEDICINE

## 2022-06-06 RX ORDER — LEVOTHYROXINE SODIUM 0.03 MG/1
25 TABLET ORAL DAILY
Qty: 90 TABLET | Refills: 1 | Status: CANCELLED | OUTPATIENT
Start: 2022-06-06

## 2022-06-06 RX ORDER — ERGOCALCIFEROL 1.25 MG/1
50000 CAPSULE ORAL WEEKLY
Qty: 12 CAPSULE | Refills: 0 | Status: CANCELLED | OUTPATIENT
Start: 2022-06-06

## 2022-06-06 NOTE — PATIENT INSTRUCTIONS
The recommended dose for calcium is 1500 mg each day. You can get this by taking 500 mg twice daily. Once in the morning and once in the evening. This will get you to 1000 mg daily with the rest coming from dietary intake. The recommended dose for Vitamin D is 1000 Units each day. This is normally divided into a 400 International Units twice a day, once in the morning and once in the evening. Vitamin D helps the body to absorb calcium in the gut. For you, I recommend 2000 IU     There are many over the counter formulations that provide calcium and Vitamin D in one tablet/supplement.   You can choose which one you like best!      DASH diet

## 2022-07-18 ENCOUNTER — APPOINTMENT (OUTPATIENT)
Dept: GENERAL RADIOLOGY | Age: 27
End: 2022-07-18
Payer: COMMERCIAL

## 2022-07-18 ENCOUNTER — HOSPITAL ENCOUNTER (EMERGENCY)
Age: 27
Discharge: HOME | End: 2022-07-18
Payer: COMMERCIAL

## 2022-07-18 VITALS
OXYGEN SATURATION: 97 % | TEMPERATURE: 98.6 F | HEART RATE: 98 BPM | SYSTOLIC BLOOD PRESSURE: 154 MMHG | DIASTOLIC BLOOD PRESSURE: 83 MMHG | HEIGHT: 66 IN | BODY MASS INDEX: 47.09 KG/M2 | RESPIRATION RATE: 16 BRPM | WEIGHT: 293 LBS

## 2022-07-18 DIAGNOSIS — S93.402A SPRAIN OF LEFT ANKLE, UNSPECIFIED LIGAMENT, INITIAL ENCOUNTER: Primary | ICD-10-CM

## 2022-07-18 DIAGNOSIS — S50.11XA CONTUSION OF RIGHT FOREARM, INITIAL ENCOUNTER: ICD-10-CM

## 2022-07-18 DIAGNOSIS — S21.011A LACERATION OF RIGHT BREAST, INITIAL ENCOUNTER: ICD-10-CM

## 2022-07-18 PROCEDURE — 71046 X-RAY EXAM CHEST 2 VIEWS: CPT

## 2022-07-18 PROCEDURE — 73090 X-RAY EXAM OF FOREARM: CPT

## 2022-07-18 PROCEDURE — 99283 EMERGENCY DEPT VISIT LOW MDM: CPT

## 2022-07-18 PROCEDURE — 73630 X-RAY EXAM OF FOOT: CPT

## 2022-07-18 PROCEDURE — 73610 X-RAY EXAM OF ANKLE: CPT

## 2022-07-18 PROCEDURE — 2500000003 HC RX 250 WO HCPCS: Performed by: PHYSICIAN ASSISTANT

## 2022-07-18 RX ORDER — NAPROXEN 500 MG/1
500 TABLET ORAL 2 TIMES DAILY
Qty: 14 TABLET | Refills: 0 | Status: SHIPPED | OUTPATIENT
Start: 2022-07-18 | End: 2022-07-18

## 2022-07-18 RX ORDER — NAPROXEN 500 MG/1
500 TABLET ORAL 2 TIMES DAILY
Qty: 14 TABLET | Refills: 0 | Status: SHIPPED | OUTPATIENT
Start: 2022-07-18 | End: 2022-08-15 | Stop reason: ALTCHOICE

## 2022-07-18 RX ORDER — LIDOCAINE HYDROCHLORIDE 10 MG/ML
5 INJECTION, SOLUTION INFILTRATION; PERINEURAL ONCE
Status: COMPLETED | OUTPATIENT
Start: 2022-07-18 | End: 2022-07-18

## 2022-07-18 RX ADMIN — LIDOCAINE HYDROCHLORIDE 5 ML: 10 INJECTION, SOLUTION INFILTRATION; PERINEURAL at 11:15

## 2022-07-18 ASSESSMENT — PAIN SCALES - GENERAL: PAINLEVEL_OUTOF10: 4

## 2022-07-18 ASSESSMENT — PAIN DESCRIPTION - LOCATION: LOCATION: ANKLE

## 2022-07-18 ASSESSMENT — PAIN DESCRIPTION - ORIENTATION: ORIENTATION: LEFT

## 2022-07-18 ASSESSMENT — PAIN DESCRIPTION - PAIN TYPE: TYPE: ACUTE PAIN

## 2022-07-18 ASSESSMENT — PAIN DESCRIPTION - FREQUENCY: FREQUENCY: CONTINUOUS

## 2022-07-18 ASSESSMENT — PAIN DESCRIPTION - ONSET: ONSET: SUDDEN

## 2022-07-18 ASSESSMENT — PAIN - FUNCTIONAL ASSESSMENT: PAIN_FUNCTIONAL_ASSESSMENT: 0-10

## 2022-07-18 ASSESSMENT — PAIN DESCRIPTION - DESCRIPTORS: DESCRIPTORS: THROBBING

## 2022-07-18 NOTE — ED PROVIDER NOTES
[facility]  Department of Emergency Medicine   ED  Encounter Note  Admit Date/RoomTime: 2022  9:53 AM  ED Room: [facility]    NAME: Olivier Rubio  : 1995  MRN: [id number]     Chief Complaint:  Fall (Fall off porch step last night, denies hitting head. C/O pain left ankle, swelling present. Soreness to left knee and arm) and Laceration (Small lac to right breast, cut it on crock pot that broke as she was falling)    History of Present Illness       Olivier Rubio is a 33 y.o. old female who presents to the emergency department by private vehicle, for a mechanical fall which occured 12 hours ago. Patient states that she was walking down the stairs, missed the last step and fell onto bricks. She was carrying a crock pot and it shattered in her hands. She denies hitting her head, no LOC. She denies any neck or back pain. She c/o a laceration to the right breast, pain to the right forearm, left ankle and minimal pain to the right knee. She states the left ankle is the most painful and she has been hopping on the right leg to get around at home. She denies any previous injury/surgery to the left ankle. Weight bearing makes it hurt worse. Ice improves the pain. She states that she has had a tetanus shot w/in the past 5 years. Patient denies being on any blood thinners. ROS   Pertinent positives and negatives are stated within HPI, all other systems reviewed and are negative. Past Medical History:  has a past medical history of ADHD, Anxiety, and Hypothyroidism. Surgical History:  has no past surgical history on file. Social History:  reports that she has never smoked. She has never used smokeless tobacco. She reports that she does not drink alcohol and does not use drugs. Family History: family history includes ADHD in her brother and father;  Attention Deficit Disorder in her brother; Bipolar Disorder in her father; No Known Problems in her sister and sister; Sleep Apnea in her father; Thyroid Disease in her mother. Allergies: Nickel    Physical Exam   Oxygen Saturation Interpretation: Normal.        ED Triage Vitals   BP Temp Temp Source Heart Rate Resp SpO2 Height Weight   07/18/22 0947 07/18/22 0943 07/18/22 0943 07/18/22 0943 07/18/22 0947 07/18/22 0943 07/18/22 0947 07/18/22 0953   (!) 154/83 98.6 °F (37 °C) Oral 98 16 97 % 5' 6\" (1.676 m) 300 lb (136.1 kg)         Physical Exam  Constitutional:  Alert, development consistent with age. HEENT:  NC/NT. Airway patent. Neck:  No midline or paravertebral tenderness. Normal ROM. Supple. Chest:  Symmetrical without visible rash or tenderness. Patient has a 1.5 cm laceration to the superior aspect of the right breast. Minimal TTP around the laceration. It is superficial. There are no FB noted. There is no chest wall TTP. Respiratory:  Lungs Clear to auscultation and breath sounds equal.  CV:  Regular rate and rhythm, normal heart sounds, without pathological murmurs, ectopy, gallops, or rubs. GI:  Abdomen Soft, nontender, good bowel sounds. No firm or pulsatile mass. Pelvis:  Stable, nontender to palpation. Back:  No midline or paravertebral tenderness. No costovertebral tenderness. Extremities: 2+ radial pulses b/l. Patient has moderate TTP over the right proximal ulna. There is no TTP over the olecranon process or remained of the right elbow. Patient has some mild swelling and bruising present. No abrasions or lacerations. Patient has no TTP over the right wrist or hand. Full active ROM of the right upper extremity. Patient has minimal TTP over the anterior right knee. Patient has full active ROM of the right knee, able to bear weight w/o difficulty. Patient has 2+ dorsalis pedis pulses b/l. Patient has moderate TTP over the left lateral ankle w/mild swelling present. She has limited ROM of the left ankle d/t pain.  Patient has moderate TTP over the left lateral mid foot and left proximal foot w/mild swelling present. Sensation distally is intact. Patient has no proximal fibular TTP. Integument:  Normal turgor. Warm, dry, without visible rash, unless noted elsewhere. Lymphatic: no lymphadenopathy noted  Neurological:  Oriented x3, GCS 15. Motor functions intact. Lab / Imaging Results   (All laboratory and radiology results have been personally reviewed by myself)  Labs:  No results found for this visit on 07/18/22. Imaging: All Radiology results interpreted by Radiologist unless otherwise noted. XR FOOT LEFT (MIN 3 VIEWS)   Final Result   Minimal calcaneal spurring with no acute bony abnormality. XR RADIUS ULNA RIGHT (2 VIEWS)   Final Result   No acute bony abnormality. XR ANKLE LEFT (MIN 3 VIEWS)   Final Result   No acute bony abnormality. XR CHEST (2 VW)   Final Result   No acute cardiopulmonary disease. ED Course / Medical Decision Making     Medications   lidocaine 1 % injection 5 mL (5 mLs IntraDERmal Given by Other 7/18/22 1115)            Consult(s):   None    Procedure(s):  PROCEDURE NOTE  7/18/22       Time: 1140    LACERATION REPAIR  Risks, benefits and alternatives (for applicable procedures below) described. Performed By: VIV Banks. Informed consent: Verbal consent obtained. The patient was counseled regarding the procedure in person, it's indications, risks, potential complications and alternatives and any questions were answered. Verbal consent was obtained. Laceration #: 1. Location: right breast   Length: 1.5 cm. The wound area was irrigated with sterile saline and draped in a sterile fashion. Local Anesthesia:  obtained with Lidocaine 1% without epinephrine. The wound was explored with the following results: Thickness: superficial. no foreign body or tendon injury seen. Debridement: None. Undermining: None. Wound Margins Revised: None. Flaps Aligned: yes.   The wound was closed in single layer closure with #3  4-0 Ethilon using interrupted suture(s). Dressing:  a bandage. There were no additional wounds requiring formal closure. MDM:   no acute findings on x-rays today. Tetanus is up to date. Laceration repaired w/o difficulty. Advised to ice and elevate the ankle. Advised to return to the ER if worse in any way. Otherwise to f/u w/PCp in 7-10 days for suture removal.     Plan of Care/Counseling:  Barclay Ormond, PA reviewed today's visit with the patient in addition to providing specific details for the plan of care and counseling regarding the diagnosis and prognosis. Questions are answered at this time and are agreeable with the plan. Assessment      1. Sprain of left ankle, unspecified ligament, initial encounter    2. Contusion of right forearm, initial encounter    3. Laceration of right breast, initial encounter      Plan   Discharged home. Patient condition is good    New Medications     Discharge Medication List as of 7/18/2022 12:01 PM        START taking these medications    Details   naproxen (NAPROSYN) 500 MG tablet Take 1 tablet by mouth in the morning and 1 tablet before bedtime. Do all this for 7 days. , Disp-14 tablet, R-0Print           Electronically signed by Barclay Ormond, PA   DD: 7/18/22  **This report was transcribed using voice recognition software. Every effort was made to ensure accuracy; however, inadvertent computerized transcription errors may be present.   END OF ED PROVIDER NOTE       Barclay Ormond, [place]  07/18/22 1371

## 2022-08-14 NOTE — PROGRESS NOTES
[facility] Sleep Medicine    Patient Name: Matias Colmenares  Age: 33 y.o.   : 1995    Date of Visit: 22        Review of Last Visit Summary:    The patient was last seen on 3/16/2022 for Obstructive Sleep Apnea. Interim History:     Matias Colmenares is a 33 y.o. female that  has a past medical history of ADHD, Anxiety, and Hypothyroidism. She presents in follow up to Sleep Clinic to review CPAP adherence and efficacy. Interval Events:    - Patient was prescribed AUTO CPAP following HST that indicated a moderate degree of VINNY (AHI 23). She was set up on 2022.  -She is doing well with CPAP, met initial compliance and notes benefit with use such as less nocturnal awakenings, and normalized energy levels through the day. She is not needing to take naps through the day and can function normally. Denies drowsy driving. She will be starting back at work () and is curious as to how she will feel with waking up earlier.  -She wears a full face mask with comfort most of the time, she does however, wake up several times with the mask off and she has no recollection as to taking it off. This happens about twice a week. -Happy with [facility]. -Comfortable with pressures. DME: [facility]    Sleep Study History: 1. 2018- PSG-sleep efficiency 79%, REM 5%, AHI 3.4, SPO2 giles 87%, T<90% was <1% of TST    2. 22- HST @ [facility]- wt 300 lbs, AHI 23, MARTI 22, SpO2 giles 77%, T<90% was 5% of total O2 evaluation period. Sleep History:    She had a sleep study done in 2018 with an AHI of 3.4, but has had worsening symptoms of sleep apnea  Previous study. She does have a positive family history. Patient states that she is not rested upon waking up and feels very tired throughout the day. She says there are times she feels she can fall within a few seconds, for example, when she went to a hockey game for a class field trip.   In the morning, she has to start to several alarms and feels very groggy and unable to wake up until 30 to 45 minutes after her alarm goes off. She does state that she snores loudly, but does not express witnessed nocturnal apneas. On rare occasion, she does have nocturnal gasping. She does sometimes have difficulty with memory and a nuclear a.m. headache. Patient denies drowsy driving unless she is driving for many consecutive hours ago. She has never been in a car accident because of this. The symptoms have been ongoing for years but feels they have worsened within the last several.  Patient feels she receives anywhere from 6 to 8 hours of sleep at night she tends to go to bed around 10 or 11 PM and wake up around 645. In terms of falling asleep, her degree plays a role in how fast she can fall asleep. Typically she can fall asleep quickly, on more difficult night she does not take more than 30 minutes to fall asleep. On a rare occasion that she is extremely stressed, she will take a melatonin. She states she does not want to rely on melatonin nightly. She does wake up multiple times throughout the night to either go to the bathroom or let her dog out. She does have some difficulty returning to sleep after these periods of waking up. Patient sleeps in bed, with her dog. She typically sleeps on her back and can change positions through the night. Her sleeping environment is dark and quiet, aside from a fan that blows up. She generally does not nap during the week, although she feels she could. On weekends there will be an occasional nap. She works as a teacher. Patient does not drink caffeine. She denies smoking or excessive alcohol use. Patient's weight remained stable this past year. She does admit that she has gained probably around 20 pounds since her last sleep study. Patient is interested in being retested for sleep apnea.     Bed time: 10-11 pm  Wake time:  6:45 am  Sleep Latency (min):  No more than 30 min, or she can fall asleep quickly  Sleep Medications:melatonin- rare  Awakenings:   multiple times   / bathroom  / Hard returning   Estimated Sleep time (hours):  6-8  Daytime Naps: Doesn't but she feels that she could. On weekends sometimes  Sleep disturbances: None  Sleep Location: Bed  Sleep environment: Sleeps alone with dog- changes, back  Occupation: Teacher     SLEEP HYGIENE      Activities before bed: TV, No Tv-sleeps with fan on at night  Caffeine:  0   Coffee    0   Soda    0   Tea  Alcohol: N  Tobacco: N      Sleep ROS:     Snoring: Y  Witnessed apneas: N-- but once she woke up gasping  AM Headache: Once a month  Dry Mouth: N  Daytime Sleepiness: Y  Difficulty remembering things: Sometimes  MVA  or near miss accident due to sleepiness in the past? Only time she is drowsy is if she is driving for many hours at a time. Tonsillectomy? N  Have you lost or gained weight recently? Stable --- estimates that she has gained about 20 pounds since last sleep        PARASOMNIAS/ NARCOLEPSY:  Hypnogogic/Hynopompic Hallucinations: N  Sleep paralysis: N  Cataplexy: N  REM behavior symptoms: N  Nightmare: N  Sleep Walking: N  Sleep Talking: Y  RLS Symptoms: Cramping --- in calf but rare    Past Medical History:  Past Medical History:   Diagnosis Date    ADHD     Anxiety     Hypothyroidism        Past Surgical History:    History reviewed. No pertinent surgical history. Allergies:  is allergic to nickel.   Social History:    Social History     Tobacco Use    Smoking status: Never    Smokeless tobacco: Never   Vaping Use    Vaping Use: Never used   Substance Use Topics    Alcohol use: No     Alcohol/week: 0.0 standard drinks    Drug use: Never        Family History:       Problem Relation Age of Onset    Thyroid Disease Mother     Sleep Apnea Father     ADHD Father     Bipolar Disorder Father     No Known Problems Sister     ADHD Brother     Attention Deficit Disorder Brother     No Known Problems Sister        Current Medications:    Current Outpatient Medications:     Calcium Carb-Cholecalciferol (CALCIUM 600/VITAMIN D3) 600-800 MG-UNIT TABS, Take 1 tablet by mouth in the morning and at bedtime, Disp: 60 tablet, Rfl: 1    Ferrous Sulfate (IRON) 325 (65 Fe) MG TABS, Take 1 tablet by mouth daily, Disp: , Rfl:     levothyroxine (SYNTHROID) 25 MCG tablet, Take 1 tablet by mouth Daily, Disp: 90 tablet, Rfl: 1    clonazePAM (KLONOPIN) 0.5 MG tablet, Uses very rarely, Disp: , Rfl:     escitalopram (LEXAPRO) 20 MG tablet, Take 40 mg by mouth daily , Disp: , Rfl:     amphetamine-dextroamphetamine (ADDERALL) 20 MG tablet, Take 20 mg by mouth daily as needed. , Disp: , Rfl:     Sleep Medicine 8/15/2022 3/15/2022 3/1/2019 12/20/2018   Sitting and reading 1 2 3 3   Watching TV 1 3 3 3   Sitting, inactive in a public place (e.g. a theatre or a meeting) 0 2 2 2   As a passenger in a car for an hour without a break 1 1 2 2   Lying down to rest in the afternoon when circumstances permit 3 3 3 3   Sitting and talking to someone 0 1 2 2   Sitting quietly after a lunch without alcohol 1 2 3 2   In a car, while stopped for a few minutes in traffic 0 0 2 2   Total score 7 14 20 19   Neck circumference (Inches) - - 15 15       Review of Systems:    Constitutional: no chills, no fever   Eyes: no blurred vision   Cardiovascular: no chest pain,   Respiratory: no cough, no shortness of breath   Gastrointestinal:  no nausea,  no vomiting, no diarrhea. Musculoskeletal: no arthralgias, no back pain   Neurological:  no dizziness,  no headache, no memory changes. Endocrine: No chills      Objective:   PHYSICAL EXAM:    BP (!) 140/82 (Site: Left Upper Arm, Position: Sitting, Cuff Size: Large Adult)   Pulse 76   Resp 14   Ht 5' 6\" (1.676 m)   Wt (!) 324 lb 3.2 oz (147.1 kg)   SpO2 98%   BMI 52.33 kg/m²     Physical exam:  Gen: No acute distress. BMI of Body mass index is 52.33 kg/m². Neck: Trachea midline. No obvious mass.  Neck circumference     Resp: No accessory muscle use. No crackles. No wheezes. No rhonchi. CV: Regular rate. Regular rhythm. No murmur or rub. Skin: Warm and dry. M/S: No cyanosis. No obvious joint deformity. Neuro: Awake. Alert. Moves all four extremities. Psych: Alert and oriented. No anxiety. CPAP Compliance Download -- accessed via [employer] 8/16/2022 Set up date 6/30/2022        Assessment:      Robinson Berrios was seen today for sleep apnea. Diagnoses and all orders for this visit:    Obstructive sleep apnea  -     DME Order for CPAP as OP  -     DME Order for CPAP as OP  -     DME Order for CPAP as OP     Plan:     Beverly Vidal is a 33 y.o. female that  has a past medical history of ADHD, Anxiety, and Hypothyroidism. She presents in follow up to Sleep Clinic to review CPAP adherence and efficacy. She demonstrates good adherence with resolution of respiratory events (residual AHI 0.3). She notes benefit with CPAP therapy (namely, more consolidated and refreshing sleep, and more energy during the day) and is motivated to continue use. Obstructive Sleep Apnea    -Recent HST showed moderate degree of VINNY ( AHI 23). -Based on sleep study results, review of device remote download, and discussion with patient, will continue auto-CPAP therapy at adjusted settings of 6-12 cm of water (previously 5-20). - Settings remotely adjusted in [facility]. Prescription will be sent to DME to reflect changes. - DME is [address]. - Patient is using a full face mask. No significant leak, however she finds herself removing mask several times a week. She may benefit from trying a nasal mask. Will place order for mask fitting and supplies.   -Previously discussed pathophysiology of VINNY and its impact on daily well-being, as well as cardiometabolic and neurocognitive health (particularly in moderate-severe cases).   -Patient understands that CPAP should be worn every night for the duration of the night (in order to not miss therapy during early-morning REM period) for maximum benefit.   -Counseled on risks of driving while drowsy. Recommended a short, 10-15 min power nap (in a safe location, with car doors locked) as most effective tool if experiencing drowsiness while driving. 2. Excessive Daytime Sleepiness- Improved      - [place] score now normalized 7/24 (previously 15). -Notes significant improvement with energy levels with CPAP. -Encouraged nightly usage. 3. Chronic Sleep Maintenance Insomnia- Improved     -Patient reports improvement with symptoms with CPAP, sleeping through the night better, only waking 1-2 times a night now. 4. Obesity. Body mass index is 52.33 kg/m². -Discussed impact of weight gain on VINNY severity. Patient understands that VINNY severity may improve with weight loss but no guarantee of cure can be made.  -Can consider referral to [facility] Weight loss clinic in the future, if she would like. Follow up: Return in about 6 months (around 2/15/2023) for Follow up for sleep apnea.     Minoo Allan APRN-CNP  [address] [facility]  P -[phone] option 2  - [phone]

## 2022-08-15 ENCOUNTER — OFFICE VISIT (OUTPATIENT)
Dept: SLEEP MEDICINE | Age: 27
End: 2022-08-15
Payer: COMMERCIAL

## 2022-08-15 VITALS
SYSTOLIC BLOOD PRESSURE: 140 MMHG | WEIGHT: 293 LBS | RESPIRATION RATE: 14 BRPM | DIASTOLIC BLOOD PRESSURE: 82 MMHG | OXYGEN SATURATION: 98 % | BODY MASS INDEX: 47.09 KG/M2 | HEART RATE: 76 BPM | HEIGHT: 66 IN

## 2022-08-15 DIAGNOSIS — G47.33 OBSTRUCTIVE SLEEP APNEA: Primary | ICD-10-CM

## 2022-08-15 PROCEDURE — 99213 OFFICE O/P EST LOW 20 MIN: CPT | Performed by: NURSE PRACTITIONER

## 2022-08-15 ASSESSMENT — SLEEP AND FATIGUE QUESTIONNAIRES
ESS TOTAL SCORE: 7
HOW LIKELY ARE YOU TO NOD OFF OR FALL ASLEEP WHILE SITTING AND READING: 1
HOW LIKELY ARE YOU TO NOD OFF OR FALL ASLEEP WHILE SITTING INACTIVE IN A PUBLIC PLACE: 0
HOW LIKELY ARE YOU TO NOD OFF OR FALL ASLEEP WHEN YOU ARE A PASSENGER IN A CAR FOR AN HOUR WITHOUT A BREAK: 1
HOW LIKELY ARE YOU TO NOD OFF OR FALL ASLEEP WHILE SITTING QUIETLY AFTER LUNCH WITHOUT ALCOHOL: 1
HOW LIKELY ARE YOU TO NOD OFF OR FALL ASLEEP IN A CAR, WHILE STOPPED FOR A FEW MINUTES IN TRAFFIC: 0
HOW LIKELY ARE YOU TO NOD OFF OR FALL ASLEEP WHILE LYING DOWN TO REST IN THE AFTERNOON WHEN CIRCUMSTANCES PERMIT: 3
HOW LIKELY ARE YOU TO NOD OFF OR FALL ASLEEP WHILE WATCHING TV: 1
HOW LIKELY ARE YOU TO NOD OFF OR FALL ASLEEP WHILE SITTING AND TALKING TO SOMEONE: 0

## 2022-12-16 ENCOUNTER — HOSPITAL ENCOUNTER (EMERGENCY)
Age: 27
Discharge: HOME | End: 2022-12-16
Attending: EMERGENCY MEDICINE
Payer: COMMERCIAL

## 2022-12-16 VITALS
RESPIRATION RATE: 16 BRPM | DIASTOLIC BLOOD PRESSURE: 76 MMHG | BODY MASS INDEX: 46.61 KG/M2 | SYSTOLIC BLOOD PRESSURE: 152 MMHG | WEIGHT: 290 LBS | OXYGEN SATURATION: 97 % | HEIGHT: 66 IN | TEMPERATURE: 98 F | HEART RATE: 79 BPM

## 2022-12-16 DIAGNOSIS — S91.115A LACERATION OF FOURTH TOE OF LEFT FOOT, INITIAL ENCOUNTER: Primary | ICD-10-CM

## 2022-12-16 PROCEDURE — 99283 EMERGENCY DEPT VISIT LOW MDM: CPT

## 2022-12-16 PROCEDURE — 12001 RPR S/N/AX/GEN/TRNK 2.5CM/<: CPT

## 2022-12-16 PROCEDURE — 6370000000 HC RX 637 (ALT 250 FOR IP): Performed by: EMERGENCY MEDICINE

## 2022-12-16 RX ORDER — IBUPROFEN 600 MG/1
600 TABLET ORAL ONCE
Status: COMPLETED | OUTPATIENT
Start: 2022-12-16 | End: 2022-12-16

## 2022-12-16 RX ORDER — CEPHALEXIN 500 MG/1
500 CAPSULE ORAL ONCE
Status: COMPLETED | OUTPATIENT
Start: 2022-12-16 | End: 2022-12-16

## 2022-12-16 RX ORDER — LORAZEPAM 0.5 MG/1
TABLET ORAL
COMMUNITY
Start: 2022-09-30

## 2022-12-16 RX ORDER — IBUPROFEN 600 MG/1
600 TABLET ORAL EVERY 8 HOURS PRN
Qty: 15 TABLET | Refills: 0 | Status: SHIPPED | OUTPATIENT
Start: 2022-12-16 | End: 2022-12-21

## 2022-12-16 RX ORDER — TETANUS AND DIPHTHERIA TOXOIDS ADSORBED 2; 2 [LF]/.5ML; [LF]/.5ML
0.5 INJECTION INTRAMUSCULAR ONCE
Status: DISCONTINUED | OUTPATIENT
Start: 2022-12-16 | End: 2022-12-16 | Stop reason: HOSPADM

## 2022-12-16 RX ORDER — CEPHALEXIN 500 MG/1
500 CAPSULE ORAL 2 TIMES DAILY
Qty: 10 CAPSULE | Refills: 0 | Status: SHIPPED | OUTPATIENT
Start: 2022-12-16 | End: 2022-12-21

## 2022-12-16 RX ORDER — METHYLPHENIDATE HYDROCHLORIDE 10 MG/1
TABLET ORAL
COMMUNITY
Start: 2022-10-04

## 2022-12-16 RX ADMIN — CEPHALEXIN 500 MG: 500 CAPSULE ORAL at 04:20

## 2022-12-16 RX ADMIN — IBUPROFEN 600 MG: 600 TABLET ORAL at 04:19

## 2022-12-16 ASSESSMENT — PAIN - FUNCTIONAL ASSESSMENT
PAIN_FUNCTIONAL_ASSESSMENT: NONE - DENIES PAIN
PAIN_FUNCTIONAL_ASSESSMENT: 0-10

## 2022-12-16 ASSESSMENT — PAIN DESCRIPTION - PAIN TYPE: TYPE: ACUTE PAIN

## 2022-12-16 ASSESSMENT — PAIN DESCRIPTION - ORIENTATION
ORIENTATION: LEFT
ORIENTATION: LEFT

## 2022-12-16 ASSESSMENT — PAIN DESCRIPTION - DESCRIPTORS
DESCRIPTORS: BURNING
DESCRIPTORS: SORE

## 2022-12-16 ASSESSMENT — PAIN SCALES - GENERAL
PAINLEVEL_OUTOF10: 4
PAINLEVEL_OUTOF10: 2

## 2022-12-16 ASSESSMENT — PAIN DESCRIPTION - LOCATION
LOCATION: FOOT
LOCATION: TOE (COMMENT WHICH ONE)

## 2022-12-16 NOTE — PROGRESS NOTES
The following care caps have been identified:  Covid #4 - patient encouraged to obtain  Marlon Medina, LPN    3 tubes of blood drawn from RFA  (2 green) and sent via tube system.     [facility] LPN

## 2022-12-16 NOTE — LETTER
[facility]  [id number] [place] [address]  Phone: [phone]               December 16, 2022    Patient: Michel Simms   YOB: 1995   Date of Visit: 12/16/2022       To Whom It May Concern:    Bienvenido Bourgeois was seen and treated in our emergency department on 12/16/2022. She should not work today but can return to work in 2-3 days.        Sincerely,               Signature:__________________________________

## 2022-12-16 NOTE — ED PROVIDER NOTES
HPI:  12/16/22, Time: 3:59 AM LILIAN Barnett is a 33 y.o. female presenting to the ED for laceration of L 4th toe on a dog crate, beginning several hours ago. The complaint has been persistent, mild in severity, and worsened by nothing. Patient rates her pain as 2/10 severity. No relieving factors. No other complaints. Review of Systems:   A complete review of systems was performed and pertinent positives and negatives are stated within HPI, all other systems reviewed and are negative.    --------------------------------------------- PAST HISTORY ---------------------------------------------  Past Medical History:  has a past medical history of ADHD, Anxiety, and Hypothyroidism. Past Surgical History:  has no past surgical history on file. Social History:  reports that she has never smoked. She has never used smokeless tobacco. She reports that she does not drink alcohol and does not use drugs. Family History: family history includes ADHD in her brother and father; Attention Deficit Disorder in her brother; Bipolar Disorder in her father; No Known Problems in her sister and sister; Sleep Apnea in her father; Thyroid Disease in her mother. The patients home medications have been reviewed. Allergies: Nickel    -------------------------------------------------- RESULTS -------------------------------------------------  All laboratory and radiology results have been personally reviewed by myself   LABS:  No results found for this visit on 12/16/22. RADIOLOGY:  Interpreted by Radiologist.  No orders to display       ------------------------- NURSING NOTES AND VITALS REVIEWED ---------------------------  The nursing notes within the ED encounter and vital signs as below have been reviewed.    BP (!) 152/76   Pulse 79   Temp 98 °F (36.7 °C) (Oral)   Resp 16   Ht 5' 6\" (1.676 m)   Wt 290 lb (131.5 kg)   LMP 10/20/2022   SpO2 97%   BMI 46.81 kg/m²   Oxygen Saturation Interpretation: Normal      ---------------------------------------------------PHYSICAL EXAM--------------------------------------      Constitutional/General: Alert and oriented x3, well appearing, non toxic in NAD  Head: Normocephalic and atraumatic  Eyes: PERRL, EOMI  Mouth: Oropharynx clear, handling secretions, no trismus  Neck: Supple, full ROM,   Pulmonary: Lungs clear to auscultation bilaterally, no wheezes, rales, or rhonchi. Not in respiratory distress  Cardiovascular:  Regular rate and rhythm, no murmurs, gallops, or rubs. 2+ distal pulses  Abdomen: Soft, non tender, non distended, otherwise normal  Extremities: Moves all extremities x 4. Warm and well perfused  Skin: laceration L 4th toe with a gaping no active bleeding no evidence of any foreign body retention or tendon involvement or evaluation of the wound in a bloodless field  Neurologic: GCS 15, No focal deficits. Psych: Normal Affect      ------------------------------ ED COURSE/MEDICAL DECISION MAKING----------------------  Medications   diptheria-tetanus toxoids (DECAVAC) 2-2 LF/0.5ML injection 0.5 mL (0.5 mLs IntraMUSCular Not Given 12/16/22 0421)   ibuprofen (ADVIL;MOTRIN) tablet 600 mg (600 mg Oral Given 12/16/22 0419)   cephALEXin (KEFLEX) capsule 500 mg (500 mg Oral Given 12/16/22 0420)       ED COURSE:     Medical Decision Making:   PROCEDURE NOTE  12/16/22         LACERATION REPAIR  Risks, benefits and alternatives (for applicable procedures below) described. Performed By: Izzy Langley MD.  Informed consent: Verbal consent obtained. The patient was counseled regarding the procedure in person, it's indications, risks, potential complications and alternatives and any questions were answered. Verbal consent was obtained. Laceration #: 1. Location: L 4th to3  Length: 1 cm. The wound area was irrigated with soap & water and draped in a sterile fashion. Local Anesthesia:  not required/indicated.   The wound was explored with the following results: Thickness: full thickness. no foreign body or tendon injury seen. Debridement: None. Undermining: None. Wound Margins Revised: None. Flaps Aligned: yes. The wound was closed with Derma-bond tissue adhesive. Dressing:  no dressing required. There were no additional wounds requiring formal closure. Counseling: The emergency provider has spoken with the patient and discussed todays results, in addition to providing specific details for the plan of care and counseling regarding the diagnosis and prognosis. Questions are answered at this time and they are agreeable with the plan.      --------------------------------- IMPRESSION AND DISPOSITION ---------------------------------    IMPRESSION  1. Laceration of fourth toe of left foot, initial encounter        DISPOSITION  Disposition: Discharge to home  Patient condition is stable      NOTE: This report was transcribed using voice recognition software.  Every effort was made to ensure accuracy; however, inadvertent computerized transcription errors may be present        Inessa Delong MD  12/16/22 1815

## 2022-12-16 NOTE — LETTER
[facility]  [id number] [facility] [address]  Phone: [phone]               December 16, 2022    Patient: Domenica Mason   YOB: 1995   Date of Visit: 12/16/2022       To Whom It May Concern:    Paul Turner was seen and treated in our emergency department on 12/16/2022. She should not go to work today since the foot needs immobilized and elevated.        Sincerely,       Mikhail Kiran RN         Signature:__________________________________

## 2022-12-20 NOTE — PROGRESS NOTES
[facility] Internal Medicine      SUBJECTIVE:  Domonique Larson (:  1995) is a 33 y.o. female here for evaluation of the following chief complaint(s):  Anemia and Thyroid Problem  ER visit on 2022 - laceration on her L 4th toe - on a dog crate. Wound was closed with Derma-bond. Was prescribed Cephalexin, 500 mg twice daily for 5 days for this. Will be finished with this tomorrow. Opened back up on Saturday. No oozing. Walking aggravates this. Clean wound. Recommend a small dressing to keep wound clean and dry. Ok for once daily triple antibiotic ointment. Supportive shoe to keep toe as immobile as possible as location of the wound makes this prone to longer healing time. VINNY - saw JOSELIN Janice Noel on 8/15/2022 for CPAP adherence and efficacy. Reported feeling better with CPAP usage. Settings were adjusted. She is to return in 6 months. Continue CPAP and follow up in February as previously scheduled. Hypothyroidism - is to be on levothyroxine, 25 mcg daily. Repeat TSH/check free T4. Vitamin D deficiency - completed a course of supplementation. Will recheck at next visit once supplement has begun. Begin Vitamin D 2,000 IU daily divided into two doses. Iron deficiency - was advised in  to resume iron therapy. Was going to try every other day iron to prevent constipation. Recheck CBC and iron studies today. ADHD - follows in [place] but desires to have a local provider that is covered by insurance as current provider is paid for out of pocket. Takes lexapro 40 mg daily. Prescribe emily which helped but worsened her anxiety. Pays out of pocket for lexapro $30 per month. Also placed on lorazepam, 0.5 mg 1/2 to one 1 tablet one time per day for severe anxiety. Needs this about 1 -2 times per week. Will refer to CHLOE Mittal for further evaluation of tools as it relates to Keesha's anxiety.      Will also have Epimengutierrez Lat assist in finding a local provider to address both Keesha's anxiety and ADHD. Obesity - still interested in medication for this, but anxiety and ADHD are not controlled so do not want to initiate therapy for this until these are controlled. Eugene Stevenson states that she has paid attention to her eating habits and relates that stress around work is what drives her to eat when she is not hungry. With this insight, will have [address] work with Eugene Stevenson to address different coping mechanisms for anxiety. Normal CMP on 3/1/2022. Review of Systems    Current Outpatient Medications on File Prior to Visit   Medication Sig Dispense Refill    cephALEXin (KEFLEX) 500 MG capsule Take 1 capsule by mouth 2 times daily for 5 days 10 capsule 0    ibuprofen (ADVIL;MOTRIN) 600 MG tablet Take 1 tablet by mouth every 8 hours as needed for Pain 15 tablet 0    LORazepam (ATIVAN) 0.5 MG tablet TAKE 1/2 TO 1 (ONE-HALF TO ONE) TABLET BY MOUTH AS NEEDED FOR SEVERE ANXIETY, MAX OF 1 TAB PER DAY      methylphenidate (RITALIN) 10 MG tablet TAKE 1 TABLET BY MOUTH TWICE DAILY      levothyroxine (SYNTHROID) 25 MCG tablet Take 1 tablet by mouth Daily 90 tablet 1    clonazePAM (KLONOPIN) 0.5 MG tablet Uses very rarely      escitalopram (LEXAPRO) 20 MG tablet Take 40 mg by mouth daily       amphetamine-dextroamphetamine (ADDERALL) 20 MG tablet Take 20 mg by mouth daily as needed. No current facility-administered medications on file prior to visit. OBJECTIVE:    VS:   Vitals:    12/21/22 1053   BP: 136/79   Site: Right Upper Arm   Position: Sitting   Cuff Size: Large Adult   Pulse: (!) 116   Resp: 18   Temp: 97.5 °F (36.4 °C)   TempSrc: Temporal   SpO2: 96%   Weight: 282 lb (127.9 kg)   Height: 5' 6\" (1.676 m)     Physical Exam   Lungs:  CTA B  Neck:   No carotid bruits appreciated B.   CVS:  +s1/s2 without m/g/r appreciated.     Abd:  + BS, NTND, No renal or aortic bruits   Extr:  2+ DP/PT pulses B, no pitting edema  Left foot with Laceration at junction of tarsal/metatarsal junction on plantar surface. Clean and dry, no drainage or erythema. RTC:  Return in about 3 months (around 3/21/2023).       Diana Burns MD   12/21/2022 11:44 AM

## 2022-12-21 ENCOUNTER — OFFICE VISIT (OUTPATIENT)
Dept: INTERNAL MEDICINE | Age: 27
End: 2022-12-21
Payer: COMMERCIAL

## 2022-12-21 VITALS
BODY MASS INDEX: 45.32 KG/M2 | TEMPERATURE: 97.5 F | HEIGHT: 66 IN | OXYGEN SATURATION: 96 % | RESPIRATION RATE: 18 BRPM | SYSTOLIC BLOOD PRESSURE: 136 MMHG | DIASTOLIC BLOOD PRESSURE: 79 MMHG | WEIGHT: 282 LBS | HEART RATE: 116 BPM

## 2022-12-21 DIAGNOSIS — D50.9 IRON DEFICIENCY ANEMIA, UNSPECIFIED IRON DEFICIENCY ANEMIA TYPE: ICD-10-CM

## 2022-12-21 DIAGNOSIS — E03.9 ACQUIRED HYPOTHYROIDISM: Primary | ICD-10-CM

## 2022-12-21 DIAGNOSIS — F41.9 ANXIETY: ICD-10-CM

## 2022-12-21 DIAGNOSIS — E03.9 ACQUIRED HYPOTHYROIDISM: ICD-10-CM

## 2022-12-21 DIAGNOSIS — F90.0 ATTENTION DEFICIT HYPERACTIVITY DISORDER (ADHD), PREDOMINANTLY INATTENTIVE TYPE: ICD-10-CM

## 2022-12-21 LAB
BASOPHILS ABSOLUTE: 0.03 E9/L (ref 0–0.2)
BASOPHILS RELATIVE PERCENT: 0.4 % (ref 0–2)
EOSINOPHILS ABSOLUTE: 0.19 E9/L (ref 0.05–0.5)
EOSINOPHILS RELATIVE PERCENT: 2.6 % (ref 0–6)
HCT VFR BLD CALC: 35.7 % (ref 34–48)
HEMOGLOBIN: 10.6 G/DL (ref 11.5–15.5)
IMMATURE GRANULOCYTES #: 0.03 E9/L
IMMATURE GRANULOCYTES %: 0.4 % (ref 0–5)
LYMPHOCYTES ABSOLUTE: 2.14 E9/L (ref 1.5–4)
LYMPHOCYTES RELATIVE PERCENT: 29.6 % (ref 20–42)
MCH RBC QN AUTO: 21.2 PG (ref 26–35)
MCHC RBC AUTO-ENTMCNC: 29.7 % (ref 32–34.5)
MCV RBC AUTO: 71.3 FL (ref 80–99.9)
MONOCYTES ABSOLUTE: 0.48 E9/L (ref 0.1–0.95)
MONOCYTES RELATIVE PERCENT: 6.6 % (ref 2–12)
NEUTROPHILS ABSOLUTE: 4.36 E9/L (ref 1.8–7.3)
NEUTROPHILS RELATIVE PERCENT: 60.4 % (ref 43–80)
PDW BLD-RTO: 16.2 FL (ref 11.5–15)
PLATELET # BLD: 338 E9/L (ref 130–450)
PMV BLD AUTO: 8.6 FL (ref 7–12)
RBC # BLD: 5.01 E12/L (ref 3.5–5.5)
T4 FREE: 0.77 NG/DL (ref 0.93–1.7)
TSH SERPL DL<=0.05 MIU/L-ACNC: 3.46 UIU/ML (ref 0.27–4.2)
WBC # BLD: 7.2 E9/L (ref 4.5–11.5)

## 2022-12-21 PROCEDURE — 99214 OFFICE O/P EST MOD 30 MIN: CPT | Performed by: INTERNAL MEDICINE

## 2022-12-21 PROCEDURE — 36415 COLL VENOUS BLD VENIPUNCTURE: CPT | Performed by: INTERNAL MEDICINE

## 2022-12-21 PROCEDURE — 99212 OFFICE O/P EST SF 10 MIN: CPT | Performed by: INTERNAL MEDICINE

## 2022-12-21 NOTE — PATIENT INSTRUCTIONS
The recommended dose for calcium is 1500 mg each day. You can get this by taking 500 mg twice daily. Once in the morning and once in the evening. This will get you to 1000 mg daily with the rest coming from dietary intake. The recommended dose for Vitamin D for you is 1000 units twice a day. There are many over the counter formulations that provide calcium and Vitamin D in one tablet/supplement.   You can choose which one you like best!

## 2022-12-22 ENCOUNTER — TELEPHONE (OUTPATIENT)
Dept: INTERNAL MEDICINE | Age: 27
End: 2022-12-22

## 2022-12-22 NOTE — TELEPHONE ENCOUNTER
LISW made contact to pt related to referral. Pt would like referral for medication management. Pt currently is seeing a counselor. LISW provided contact information for [facility] for medication management.     SW also provided direct contact information for return call if pt needed additional assistance

## 2022-12-28 ENCOUNTER — TELEPHONE (OUTPATIENT)
Dept: INTERNAL MEDICINE | Age: 27
End: 2022-12-28

## 2022-12-28 DIAGNOSIS — E03.9 ACQUIRED HYPOTHYROIDISM: Primary | ICD-10-CM

## 2022-12-28 RX ORDER — LEVOTHYROXINE SODIUM 0.05 MG/1
25 TABLET ORAL DAILY
Qty: 90 TABLET | Refills: 0 | Status: SHIPPED | OUTPATIENT
Start: 2022-12-28

## 2022-12-28 NOTE — TELEPHONE ENCOUNTER
Discussed lab results with Monet Elizabeth. CBC shows anemia and indices are consistent with iron deficiency. Monet Annaers to take iron supplementation, 325 mg once daily and if she can tolerate will take twice daily. Will recheck at next visit. Monet Elizabeth will use OTC iron supplementation. Additionally, free T4 is low and TSH is 3.46. I will step up dose of levothyroxine from 25 mcg daily to 50 mcg daily and recheck TSH/free T4 in 8 weeks. Order placed today. Monet Elizabeth is in agreement with the above plan of care.      Roselyn Ho MD  12/28/2022

## 2023-01-06 ENCOUNTER — TELEPHONE (OUTPATIENT)
Dept: INTERNAL MEDICINE | Age: 28
End: 2023-01-06

## 2023-01-06 DIAGNOSIS — N92.4 EXCESSIVE BLEEDING IN PREMENOPAUSAL PERIOD: Primary | ICD-10-CM

## 2023-01-06 NOTE — TELEPHONE ENCOUNTER
Efrain Calvert reports that she has been bleeding for greater than a week and that she is now passing larger clots. I will order a CBC as well as a PT/INR to ensure normal clotting. Additionally, will call her GYN to have her seen on Monday. (Dr. Earl Nickerson). Patient aware.     Malik Major MD  1/6/2023

## 2023-02-13 ENCOUNTER — TELEPHONE (OUTPATIENT)
Dept: INTERNAL MEDICINE | Age: 28
End: 2023-02-13

## 2023-02-13 NOTE — TELEPHONE ENCOUNTER
Patient was scheduled for an appointment this morning. No further follow-up needed.      Chastity Narayanan MD  2/13/2023

## 2023-02-20 NOTE — PROGRESS NOTES
The following care gaps have been identified:  Covid 4 - patient encouraged to obtain  Depression screening - done  Danita Pillai LPN

## 2023-02-21 ENCOUNTER — OFFICE VISIT (OUTPATIENT)
Dept: INTERNAL MEDICINE | Age: 28
End: 2023-02-21
Payer: COMMERCIAL

## 2023-02-21 VITALS
BODY MASS INDEX: 47.09 KG/M2 | HEART RATE: 76 BPM | DIASTOLIC BLOOD PRESSURE: 81 MMHG | WEIGHT: 293 LBS | SYSTOLIC BLOOD PRESSURE: 126 MMHG | HEIGHT: 66 IN | OXYGEN SATURATION: 97 % | TEMPERATURE: 97.3 F | RESPIRATION RATE: 18 BRPM

## 2023-02-21 DIAGNOSIS — R73.03 PREDIABETES: Primary | ICD-10-CM

## 2023-02-21 DIAGNOSIS — D50.0 IRON DEFICIENCY ANEMIA DUE TO CHRONIC BLOOD LOSS: ICD-10-CM

## 2023-02-21 PROCEDURE — 99213 OFFICE O/P EST LOW 20 MIN: CPT | Performed by: INTERNAL MEDICINE

## 2023-02-21 ASSESSMENT — PATIENT HEALTH QUESTIONNAIRE - PHQ9
SUM OF ALL RESPONSES TO PHQ QUESTIONS 1-9: 0
SUM OF ALL RESPONSES TO PHQ9 QUESTIONS 1 & 2: 0
6. FEELING BAD ABOUT YOURSELF - OR THAT YOU ARE A FAILURE OR HAVE LET YOURSELF OR YOUR FAMILY DOWN: 0
SUM OF ALL RESPONSES TO PHQ QUESTIONS 1-9: 0
8. MOVING OR SPEAKING SO SLOWLY THAT OTHER PEOPLE COULD HAVE NOTICED. OR THE OPPOSITE, BEING SO FIGETY OR RESTLESS THAT YOU HAVE BEEN MOVING AROUND A LOT MORE THAN USUAL: 0
3. TROUBLE FALLING OR STAYING ASLEEP: 0
9. THOUGHTS THAT YOU WOULD BE BETTER OFF DEAD, OR OF HURTING YOURSELF: 0
5. POOR APPETITE OR OVEREATING: 0
SUM OF ALL RESPONSES TO PHQ QUESTIONS 1-9: 0
10. IF YOU CHECKED OFF ANY PROBLEMS, HOW DIFFICULT HAVE THESE PROBLEMS MADE IT FOR YOU TO DO YOUR WORK, TAKE CARE OF THINGS AT HOME, OR GET ALONG WITH OTHER PEOPLE: 0
4. FEELING TIRED OR HAVING LITTLE ENERGY: 0
1. LITTLE INTEREST OR PLEASURE IN DOING THINGS: 0
7. TROUBLE CONCENTRATING ON THINGS, SUCH AS READING THE NEWSPAPER OR WATCHING TELEVISION: 0
SUM OF ALL RESPONSES TO PHQ QUESTIONS 1-9: 0
2. FEELING DOWN, DEPRESSED OR HOPELESS: 0

## 2023-02-21 NOTE — PATIENT INSTRUCTIONS
Goal for exercise 10 minutes three times each week. I will send additional information to you via email.

## 2023-02-21 NOTE — PROGRESS NOTES
[facility] Internal Medicine      SUBJECTIVE:  Stephen Schaeffer (:  1995) is a 33 y.o. female here for evaluation of the following chief complaint(s):  Follow-up  Was seen at [facility] office and lab work was performed. Found to have a HBA1c of 5.7. Discussed with Meryle Severin that this was just within range for DM. Main discussion centered around the opportunity to reverse the trend away from prediabetes/DM. I also discussed whether additional counseling in regards to Keesha's relationship with food can be further discussed and explored. Meryle Severin currently goes to [email or web address]. I will discuss with Ayaan Graff to discuss strategies around food for Meryle Severin as previous discussion around anxiety contributing to food choices. Hypothyroidism - was controlled on last check on 2022. Taking her medication with good adherence. Continue levothyroxine. Was also told that she was anemic - last check was in December. Recheck CBC in mid- March     Having repeat US today for OB/PCOS referral.     Review of Systems as above. Current Outpatient Medications on File Prior to Visit   Medication Sig Dispense Refill    ferrous sulfate (IRON 325) 325 (65 Fe) MG tablet Take 325 mg by mouth daily (with breakfast)      levothyroxine (SYNTHROID) 50 MCG tablet Take 0.5 tablets by mouth Daily 90 tablet 0    LORazepam (ATIVAN) 0.5 MG tablet TAKE 1/2 TO 1 (ONE-HALF TO ONE) TABLET BY MOUTH AS NEEDED FOR SEVERE ANXIETY, MAX OF 1 TAB PER DAY      methylphenidate (RITALIN) 10 MG tablet TAKE 1 TABLET BY MOUTH TWICE DAILY      clonazePAM (KLONOPIN) 0.5 MG tablet Uses very rarely      escitalopram (LEXAPRO) 20 MG tablet Take 40 mg by mouth daily       amphetamine-dextroamphetamine (ADDERALL) 20 MG tablet Take 20 mg by mouth daily as needed.        ibuprofen (ADVIL;MOTRIN) 600 MG tablet Take 1 tablet by mouth every 8 hours as needed for Pain 15 tablet 0     No current facility-administered medications on file prior to visit. OBJECTIVE:    VS:   Vitals:    02/21/23 1111   BP: 126/81   Site: Right Upper Arm   Position: Sitting   Cuff Size: Large Adult   Pulse: 76   Resp: 18   Temp: 97.3 °F (36.3 °C)   TempSrc: Temporal   SpO2: 97%   Weight: (!) 335 lb (152 kg)   Height: 5' 6\" (1.676 m)     Physical Exam   Lungs:  CTA B  Neck:   No carotid bruits appreciated B.   CVS:  +s1/s2 without m/g/r appreciated. Abd:  + BS, NTND, No renal or aortic bruits   Extr:  2+ DP/PT pulses B, no pitting edema      RTC:  Return in one month.      Tony Gutierrez MD   2/28/2023 12:44 PM

## 2023-02-23 NOTE — PROGRESS NOTES
Elenita Suarez was scheduled for a Medical Nutrition Therapy zoom session on 3/20/23 and left a message she was cancelling the appointment and does not want to r/s at this time.  Electronically signed by Leeland Lanes, RD, MARIA DOLORES on 2/23/2023 at 9:49 AM

## 2023-03-01 ENCOUNTER — TELEMEDICINE (OUTPATIENT)
Dept: SLEEP CENTER | Age: 28
End: 2023-03-01
Payer: COMMERCIAL

## 2023-03-01 DIAGNOSIS — G47.33 OBSTRUCTIVE SLEEP APNEA: Primary | ICD-10-CM

## 2023-03-01 DIAGNOSIS — G47.19 EXCESSIVE DAYTIME SLEEPINESS: ICD-10-CM

## 2023-03-01 PROCEDURE — 99213 OFFICE O/P EST LOW 20 MIN: CPT | Performed by: NURSE PRACTITIONER

## 2023-03-01 ASSESSMENT — SLEEP AND FATIGUE QUESTIONNAIRES
HOW LIKELY ARE YOU TO NOD OFF OR FALL ASLEEP IN A CAR, WHILE STOPPED FOR A FEW MINUTES IN TRAFFIC: 0
HOW LIKELY ARE YOU TO NOD OFF OR FALL ASLEEP WHILE LYING DOWN TO REST IN THE AFTERNOON WHEN CIRCUMSTANCES PERMIT: 3
ESS TOTAL SCORE: 7
HOW LIKELY ARE YOU TO NOD OFF OR FALL ASLEEP WHILE SITTING INACTIVE IN A PUBLIC PLACE: 1
HOW LIKELY ARE YOU TO NOD OFF OR FALL ASLEEP WHILE SITTING AND TALKING TO SOMEONE: 0
HOW LIKELY ARE YOU TO NOD OFF OR FALL ASLEEP WHEN YOU ARE A PASSENGER IN A CAR FOR AN HOUR WITHOUT A BREAK: 0
HOW LIKELY ARE YOU TO NOD OFF OR FALL ASLEEP WHILE SITTING QUIETLY AFTER LUNCH WITHOUT ALCOHOL: 1
HOW LIKELY ARE YOU TO NOD OFF OR FALL ASLEEP WHILE WATCHING TV: 1
HOW LIKELY ARE YOU TO NOD OFF OR FALL ASLEEP WHILE SITTING AND READING: 1

## 2023-03-01 NOTE — PROGRESS NOTES
Laura Gross is a 33 y.o. female being evaluated by a Virtual Visit (video visit) encounter to address concerns as mentioned below. A caregiver was present when appropriate. Due to this being a TeleHealth encounter (During [facility]-41 public health emergency), evaluation of the following organ systems was limited: Vitals/Constitutional/EENT/Resp/CV/GI//MS/Neuro/Skin/Heme-Lymph-Imm. Pursuant to the emergency declaration under the [facility], 1135 waiver authority and the [employer] [facility] GoHealth 9941-32D, this Virtual Visit was conducted with patient's (and/or legal guardian's) consent, to reduce the patient's risk of exposure to COVID-19 and provide necessary medical care. The patient (and/or legal guardian) has also been advised to contact this office for worsening conditions or problems, and seek emergency medical treatment and/or call 911 if deemed necessary. The patient (and/or legal guardian if applicable) is aware that this is a billable service, which includes applicable co-pays. This virtual visit was conducted with patient's (and/or legal guardian's) consent. Services were provided through a video synchronous discussion virtually to substitute for in-person clinic visit. Patient and provider were both  located remotely. Patient identification was confirmed by 2 forms of personal forms of identification (Birthday and Patient's address). The patient was located in a state where the provider was licensed to provide care. YOB: 1995  Address: [facility]       Services were provided through a video synchronous discussion virtually to substitute for in-person clinic visit. An electronic signature was used to authenticate this note.   Start time: 8:40 am  End time: 8:54 am        [facility] Sleep Medicine    Patient Name: Nel Fernandez  Age: 33 y.o.   : 1995    Date of Visit: 3/1/23        Review of Last Visit Summary:    The patient was last seen on 8/15/22 for  Obstructive Sleep Apnea. Interim History:     Nel Fernandez is a 33 y.o. female that  has a past medical history of ADHD, Anxiety, and Hypothyroidism. She presents in follow up to Sleep Clinic to review CPAP adherence and efficacy. Interval Events:    - Patient was prescribed AUTO CPAP following HST that indicated a moderate degree of VINNY (AHI 23). She was set up on 2022.  -Doing well on CPAP, definitely notes differences in her energy levels on nights that she does not wear CPAP. -Sleeping more soundly through the night. Waking up less during the night.  -Continues to wear a fullface mask. No significant leaking.  -Comfortable with pressure.  -No longer experiencing insomnia symptoms. [facility]     Sleep Study History: 1. 2018- PSG-sleep efficiency 79%, REM 5%, AHI 3.4, SPO2 giles 87%, T<90% was <1% of TST    2. 22- HST @ [facility]- wt 300 lbs, AHI 23, MARTI 22, SpO2 giles 77%, T<90% was 5% of total O2 evaluation period. Sleep History:  She had a sleep study done in 2018 with an AHI of 3.4, but has had worsening symptoms of sleep apnea  Previous study. She does have a positive family history. Patient states that she is not rested upon waking up and feels very tired throughout the day. She says there are times she feels she can fall within a few seconds, for example, when she went to a hockey game for a class field trip. In the morning, she has to start to several alarms and feels very groggy and unable to wake up until 30 to 45 minutes after her alarm goes off. She does state that she snores loudly, but does not express witnessed nocturnal apneas. On rare occasion, she does have nocturnal gasping. She does sometimes have difficulty with memory and a nuclear a.m. headache.   Patient denies drowsy driving unless she is driving for many consecutive hours ago. She has never been in a car accident because of this. The symptoms have been ongoing for years but feels they have worsened within the last several.  Patient feels she receives anywhere from 6 to 8 hours of sleep at night she tends to go to bed around 10 or 11 PM and wake up around 645. In terms of falling asleep, her degree plays a role in how fast she can fall asleep. Typically she can fall asleep quickly, on more difficult night she does not take more than 30 minutes to fall asleep. On a rare occasion that she is extremely stressed, she will take a melatonin. She states she does not want to rely on melatonin nightly. She does wake up multiple times throughout the night to either go to the bathroom or let her dog out. She does have some difficulty returning to sleep after these periods of waking up. Patient sleeps in bed, with her dog. She typically sleeps on her back and can change positions through the night. Her sleeping environment is dark and quiet, aside from a fan that blows up. She generally does not nap during the week, although she feels she could. On weekends there will be an occasional nap. She works as a teacher. Patient does not drink caffeine. She denies smoking or excessive alcohol use. Patient's weight remained stable this past year. She does admit that she has gained probably around 20 pounds since her last sleep study. Patient is interested in being retested for sleep apnea. Bed time: 10-11 pm  Wake time:  6:45 am  Sleep Latency (min):  No more than 30 min, or she can fall asleep quickly  Sleep Medications:melatonin- rare  Awakenings:   multiple times   / bathroom  / Hard returning   Estimated Sleep time (hours):  6-8  Daytime Naps: Doesn't but she feels that she could.  On weekends sometimes  Sleep disturbances: None  Sleep Location: Bed  Sleep environment: Sleeps alone with dog- changes, back  Occupation: Teacher [employer]      Activities before bed: TV, No Tv-sleeps with fan on at night  Caffeine:  0   Coffee    0   Soda    0   Tea  Alcohol: N  Tobacco: N      Sleep ROS:     Snoring: Y  Witnessed apneas: N-- but once she woke up gasping  AM Headache: Once a month  Dry Mouth: N  Daytime Sleepiness: Y  Difficulty remembering things: Sometimes  MVA  or near miss accident due to sleepiness in the past? Only time she is drowsy is if she is driving for many hours at a time. Tonsillectomy? N  Have you lost or gained weight recently? Stable --- estimates that she has gained about 20 pounds since last sleep        PARASOMNIAS/ NARCOLEPSY:  Hypnogogic/Hynopompic Hallucinations: N  Sleep paralysis: N  Cataplexy: N  REM behavior symptoms: N  Nightmare: N  Sleep Walking: N  Sleep Talking: Y  RLS Symptoms: Cramping --- in calf but rare     Past Medical History:  Past Medical History:   Diagnosis Date    ADHD     Anxiety     Hypothyroidism        Past Surgical History:    History reviewed. No pertinent surgical history. Allergies:  is allergic to nickel.   Social History:    Social History     Tobacco Use    Smoking status: Never    Smokeless tobacco: Never   Vaping Use    Vaping Use: Never used   Substance Use Topics    Alcohol use: No     Alcohol/week: 0.0 standard drinks    Drug use: Never        Family History:       Problem Relation Age of Onset    Thyroid Disease Mother     Sleep Apnea Father     ADHD Father     Bipolar Disorder Father     No Known Problems Sister     No Known Problems Sister     ADHD Brother     Attention Deficit Disorder Brother     Pancreatic Cancer Paternal Uncle        Current Medications:    Current Outpatient Medications:     ferrous sulfate (IRON 325) 325 (65 Fe) MG tablet, Take 325 mg by mouth daily (with breakfast), Disp: , Rfl:     levothyroxine (SYNTHROID) 50 MCG tablet, Take 0.5 tablets by mouth Daily, Disp: 90 tablet, Rfl: 0    LORazepam (ATIVAN) 0.5 MG tablet, TAKE 1/2 TO 1 (ONE-HALF TO ONE) TABLET BY MOUTH AS NEEDED FOR SEVERE ANXIETY, MAX OF 1 TAB PER DAY, Disp: , Rfl:     methylphenidate (RITALIN) 10 MG tablet, TAKE 1 TABLET BY MOUTH TWICE DAILY, Disp: , Rfl:     clonazePAM (KLONOPIN) 0.5 MG tablet, Uses very rarely, Disp: , Rfl:     escitalopram (LEXAPRO) 20 MG tablet, Take 40 mg by mouth daily , Disp: , Rfl:     amphetamine-dextroamphetamine (ADDERALL) 20 MG tablet, Take 20 mg by mouth daily as needed. , Disp: , Rfl:     ibuprofen (ADVIL;MOTRIN) 600 MG tablet, Take 1 tablet by mouth every 8 hours as needed for Pain, Disp: 15 tablet, Rfl: 0    Sleep Medicine 3/1/2023 8/15/2022 3/15/2022 3/1/2019 12/20/2018   Sitting and reading 1 1 2 3 3   Watching TV 1 1 3 3 3   Sitting, inactive in a public place (e.g. a theatre or a meeting) 1 0 2 2 2   As a passenger in a car for an hour without a break 0 1 1 2 2   Lying down to rest in the afternoon when circumstances permit 3 3 3 3 3   Sitting and talking to someone 0 0 1 2 2   Sitting quietly after a lunch without alcohol 1 1 2 3 2   In a car, while stopped for a few minutes in traffic 0 0 0 2 2   Logan Sleepiness Score 7 7 14 20 19   Neck circumference (Inches) - - - 15 15     Review of Systems:    Constitutional: no chills, no fever   Eyes: no blurred vision   Cardiovascular: no chest pain,   Respiratory: no cough, no shortness of breath   Gastrointestinal:  no nausea,  no vomiting, no diarrhea. Musculoskeletal: no arthralgias, no back pain   Neurological:  no dizziness,  no headache, no memory changes. Endocrine: No chills    Objective:   PHYSICAL EXAM:    LMP 02/19/2023 (Exact Date)     Physical exam:  Gen: No acute distress. BMI of There is no height or weight on file to calculate BMI. Neck: Able to speak in full sentences  M/S: No cyanosis. No obvious joint deformity. Neuro: Awake. Alert. Moves all four extremities. Psych: Alert and oriented. No anxiety. CPAP Compliance Download -- accessed via [employer].  Set up 6/30/22          Assessment:      Yu Weller was seen today for sleep apnea. Diagnoses and all orders for this visit:    Obstructive sleep apnea  -     DME Order for CPAP as OP    Excessive daytime sleepiness     Plan:     Brandie Vela is a 33 y.o. female that  has a past medical history of ADHD, Anxiety, and Hypothyroidism. She presents in follow up to Sleep Clinic to review CPAP adherence and efficacy. She demonstrates excellent adherence with resolution of respiratory events (residual AHI 0.3). She notes benefit with CPAP therapy (namely, more consolidated and refreshing sleep, more energy during the day) and is motivated to continue use. Moderate Obstructive Sleep Apnea    -HST on 5/23/22 showed AHI of 23.  -Based on sleep study results, review of device remote download, and discussion with patient, will continue auto-CPAP therapy at settings of 6-12 cm of water. - Settings are appropriate, with residual AHI 0.3 and maximum and average pressures within prescribed range. - DME is [facility]. - Patient is using a full face mask. No significant leak. -Supply order placed.  -Previously discussed pathophysiology of VINNY and its impact on daily well-being, as well as cardiometabolic and neurocognitive health (particularly in moderate-severe cases). -Reviewed cleaning techniques. 2. Excessive Daytime Sleepiness- Resolved     -Normalized [place] 7/24.  -Continue current CPAP settings and nightly usage. Return in about 1 year (around 3/1/2024) for Follow up for sleep apnea.     Ty Joseph, [facility]  [address] [facility]  P -[phone] option 2  F- [phone]

## 2023-03-13 ENCOUNTER — TELEPHONE (OUTPATIENT)
Dept: INTERNAL MEDICINE | Age: 28
End: 2023-03-13

## 2023-03-13 NOTE — TELEPHONE ENCOUNTER
Message left on patient's voicemail letting her know appointment for 4/12/23 at 10:00 am was cancelled due to Dr. Jen Choi being on vacation. Requested patient call back to reschedule.   Josafat Mathur, FERNANDON

## 2023-03-20 ENCOUNTER — HOSPITAL ENCOUNTER (OUTPATIENT)
Dept: DIABETES SERVICES | Age: 28
Discharge: HOME | End: 2023-03-20

## 2023-03-20 NOTE — LETTER
[address] [place] [address] - Diabetes Education    2023    Re:     Brit Cardenas  :  1995    Dear Dr. Jeannette Lopez: Thank you for referring your patient, Brit Cardenas, to Diabetes Education. We were unable to provide the prescribed diabetes education due to the following reason:      [x]  They do not want to schedule at this time. []   Other: This letter is for your records.     If we can be of any further assistance with this patient, please contact us at:  [address] [place] [address] 500:  [address] [place]:  [facility]. 285:  [phone]        Sincerely,    800   Diabetes Education Department  [employer] [facility]

## 2023-03-31 ENCOUNTER — HOSPITAL ENCOUNTER (OUTPATIENT)
Age: 28
Discharge: HOME | End: 2023-03-31
Payer: COMMERCIAL

## 2023-03-31 DIAGNOSIS — D50.0 IRON DEFICIENCY ANEMIA DUE TO CHRONIC BLOOD LOSS: ICD-10-CM

## 2023-03-31 LAB
BASOPHILS # BLD: 0.03 E9/L (ref 0–0.2)
BASOPHILS NFR BLD: 0.4 % (ref 0–2)
EOSINOPHIL # BLD: 0.17 E9/L (ref 0.05–0.5)
EOSINOPHIL NFR BLD: 2.1 % (ref 0–6)
ERYTHROCYTE [DISTWIDTH] IN BLOOD BY AUTOMATED COUNT: 16.8 FL (ref 11.5–15)
HCT VFR BLD AUTO: 36.2 % (ref 34–48)
HGB BLD-MCNC: 10.6 G/DL (ref 11.5–15.5)
IMM GRANULOCYTES # BLD: 0.02 E9/L
IMM GRANULOCYTES NFR BLD: 0.2 % (ref 0–5)
LYMPHOCYTES # BLD: 2.22 E9/L (ref 1.5–4)
LYMPHOCYTES NFR BLD: 27 % (ref 20–42)
MCH RBC QN AUTO: 22.8 PG (ref 26–35)
MCHC RBC AUTO-ENTMCNC: 29.3 % (ref 32–34.5)
MCV RBC AUTO: 77.8 FL (ref 80–99.9)
MONOCYTES # BLD: 0.51 E9/L (ref 0.1–0.95)
MONOCYTES NFR BLD: 6.2 % (ref 2–12)
NEUTROPHILS # BLD: 5.28 E9/L (ref 1.8–7.3)
NEUTS SEG NFR BLD: 64.1 % (ref 43–80)
PLATELET # BLD AUTO: 350 E9/L (ref 130–450)
PMV BLD AUTO: 8.5 FL (ref 7–12)
RBC # BLD AUTO: 4.65 E12/L (ref 3.5–5.5)
WBC # BLD: 8.2 E9/L (ref 4.5–11.5)

## 2023-03-31 PROCEDURE — 36415 COLL VENOUS BLD VENIPUNCTURE: CPT

## 2023-03-31 PROCEDURE — 85025 COMPLETE CBC W/AUTO DIFF WBC: CPT

## 2023-04-03 NOTE — PROGRESS NOTES
to see Radha Gerber in [place]. Discuss with Radha Mayes. Switched to [facility] for OB/GYN care. Review of Systems as above. Current Outpatient Medications on File Prior to Visit   Medication Sig Dispense Refill    ferrous sulfate (IRON 325) 325 (65 Fe) MG tablet Take 1 tablet by mouth daily (with breakfast)      ibuprofen (ADVIL;MOTRIN) 600 MG tablet Take 1 tablet by mouth every 8 hours as needed for Pain 15 tablet 0    LORazepam (ATIVAN) 0.5 MG tablet TAKE 1/2 TO 1 (ONE-HALF TO ONE) TABLET BY MOUTH AS NEEDED FOR SEVERE ANXIETY, MAX OF 1 TAB PER DAY      methylphenidate (RITALIN) 10 MG tablet TAKE 1 TABLET BY MOUTH TWICE DAILY      clonazePAM (KLONOPIN) 0.5 MG tablet Uses very rarely      escitalopram (LEXAPRO) 20 MG tablet Take 2 tablets by mouth daily      amphetamine-dextroamphetamine (ADDERALL) 20 MG tablet Take 1 tablet by mouth daily as needed. ELURYNG 0.12-0.015 MG/24HR vaginal ring        No current facility-administered medications on file prior to visit. OBJECTIVE:    VS:   Vitals:    04/04/23 1511   BP: 121/78   Site: Left Upper Arm   Position: Sitting   Cuff Size: Large Adult   Pulse: 98   Resp: 18   Temp: 97 °F (36.1 °C)   TempSrc: Temporal   SpO2: 98%   Weight: (!) 330 lb (149.7 kg)   Height: 5' 6\" (1.676 m)     Physical Exam   Lungs:  CTA B  Neck:   No carotid bruits appreciated B.   CVS:  +s1/s2 without m/g/r appreciated. Abd:  + BS, NTND, No renal or aortic bruits   Extr:  2+ DP/PT pulses B, no pitting edema     RTC:  Return in about 3 months (around 7/4/2023).       Tori Alicea MD   4/4/2023 4:00 PM

## 2023-04-04 ENCOUNTER — OFFICE VISIT (OUTPATIENT)
Dept: INTERNAL MEDICINE | Age: 28
End: 2023-04-04
Payer: COMMERCIAL

## 2023-04-04 VITALS
TEMPERATURE: 97 F | BODY MASS INDEX: 47.09 KG/M2 | HEART RATE: 98 BPM | HEIGHT: 66 IN | WEIGHT: 293 LBS | OXYGEN SATURATION: 98 % | RESPIRATION RATE: 18 BRPM | DIASTOLIC BLOOD PRESSURE: 78 MMHG | SYSTOLIC BLOOD PRESSURE: 121 MMHG

## 2023-04-04 DIAGNOSIS — F90.9 ATTENTION DEFICIT HYPERACTIVITY DISORDER (ADHD), UNSPECIFIED ADHD TYPE: ICD-10-CM

## 2023-04-04 DIAGNOSIS — D50.9 IRON DEFICIENCY ANEMIA, UNSPECIFIED IRON DEFICIENCY ANEMIA TYPE: Primary | ICD-10-CM

## 2023-04-04 DIAGNOSIS — E66.01 MORBID OBESITY (HCC): ICD-10-CM

## 2023-04-04 DIAGNOSIS — F32.A ANXIETY AND DEPRESSION: ICD-10-CM

## 2023-04-04 DIAGNOSIS — E03.9 ACQUIRED HYPOTHYROIDISM: ICD-10-CM

## 2023-04-04 DIAGNOSIS — F41.9 ANXIETY AND DEPRESSION: ICD-10-CM

## 2023-04-04 PROCEDURE — 99213 OFFICE O/P EST LOW 20 MIN: CPT | Performed by: INTERNAL MEDICINE

## 2023-04-04 RX ORDER — LEVOTHYROXINE SODIUM 0.05 MG/1
50 TABLET ORAL DAILY
Qty: 90 TABLET | Refills: 1 | Status: SHIPPED | OUTPATIENT
Start: 2023-04-04

## 2023-04-04 RX ORDER — LEVOTHYROXINE SODIUM 0.05 MG/1
50 TABLET ORAL DAILY
Qty: 90 TABLET | Refills: 0 | Status: CANCELLED | OUTPATIENT
Start: 2023-04-04

## 2023-04-04 RX ORDER — ETONOGESTREL AND ETHINYL ESTRADIOL .12; .015 MG/D; MG/D
RING VAGINAL
COMMUNITY
Start: 2023-03-30

## 2023-04-04 SDOH — ECONOMIC STABILITY: FOOD INSECURITY: WITHIN THE PAST 12 MONTHS, YOU WORRIED THAT YOUR FOOD WOULD RUN OUT BEFORE YOU GOT MONEY TO BUY MORE.: NEVER TRUE

## 2023-04-04 SDOH — ECONOMIC STABILITY: HOUSING INSECURITY: IN THE LAST 12 MONTHS, HOW MANY PLACES HAVE YOU LIVED?: 2

## 2023-04-04 SDOH — ECONOMIC STABILITY: FOOD INSECURITY: WITHIN THE PAST 12 MONTHS, THE FOOD YOU BOUGHT JUST DIDN'T LAST AND YOU DIDN'T HAVE MONEY TO GET MORE.: NEVER TRUE

## 2023-04-04 SDOH — ECONOMIC STABILITY: INCOME INSECURITY: IN THE LAST 12 MONTHS, WAS THERE A TIME WHEN YOU WERE NOT ABLE TO PAY THE MORTGAGE OR RENT ON TIME?: NO

## 2023-04-04 ASSESSMENT — PATIENT HEALTH QUESTIONNAIRE - PHQ9
2. FEELING DOWN, DEPRESSED OR HOPELESS: 1
SUM OF ALL RESPONSES TO PHQ QUESTIONS 1-9: 6
SUM OF ALL RESPONSES TO PHQ QUESTIONS 1-9: 6
9. THOUGHTS THAT YOU WOULD BE BETTER OFF DEAD, OR OF HURTING YOURSELF: 0
7. TROUBLE CONCENTRATING ON THINGS, SUCH AS READING THE NEWSPAPER OR WATCHING TELEVISION: 2
4. FEELING TIRED OR HAVING LITTLE ENERGY: 0
6. FEELING BAD ABOUT YOURSELF - OR THAT YOU ARE A FAILURE OR HAVE LET YOURSELF OR YOUR FAMILY DOWN: 0
SUM OF ALL RESPONSES TO PHQ9 QUESTIONS 1 & 2: 2
3. TROUBLE FALLING OR STAYING ASLEEP: 0
1. LITTLE INTEREST OR PLEASURE IN DOING THINGS: SEVERAL DAYS
SUM OF ALL RESPONSES TO PHQ QUESTIONS 1-9: 6
10. IF YOU CHECKED OFF ANY PROBLEMS, HOW DIFFICULT HAVE THESE PROBLEMS MADE IT FOR YOU TO DO YOUR WORK, TAKE CARE OF THINGS AT HOME, OR GET ALONG WITH OTHER PEOPLE: 2
5. POOR APPETITE OR OVEREATING: 2
SUM OF ALL RESPONSES TO PHQ QUESTIONS 1-9: 6
1. LITTLE INTEREST OR PLEASURE IN DOING THINGS: 1
SUM OF ALL RESPONSES TO PHQ9 QUESTIONS 1 & 2: 2
8. MOVING OR SPEAKING SO SLOWLY THAT OTHER PEOPLE COULD HAVE NOTICED. OR THE OPPOSITE, BEING SO FIGETY OR RESTLESS THAT YOU HAVE BEEN MOVING AROUND A LOT MORE THAN USUAL: 0
2. FEELING DOWN, DEPRESSED OR HOPELESS: SEVERAL DAYS

## 2023-04-04 ASSESSMENT — SOCIAL DETERMINANTS OF HEALTH (SDOH): HOW HARD IS IT FOR YOU TO PAY FOR THE VERY BASICS LIKE FOOD, HOUSING, MEDICAL CARE, AND HEATING?: NOT HARD AT ALL

## 2023-04-04 ASSESSMENT — LIFESTYLE VARIABLES: HOW OFTEN DO YOU HAVE A DRINK CONTAINING ALCOHOL: NEVER

## 2023-09-11 ENCOUNTER — OFFICE VISIT (OUTPATIENT)
Dept: INTERNAL MEDICINE | Age: 28
End: 2023-09-11
Payer: COMMERCIAL

## 2023-09-11 VITALS
BODY MASS INDEX: 47.09 KG/M2 | RESPIRATION RATE: 18 BRPM | OXYGEN SATURATION: 99 % | WEIGHT: 293 LBS | HEIGHT: 66 IN | DIASTOLIC BLOOD PRESSURE: 89 MMHG | TEMPERATURE: 97.4 F | HEART RATE: 104 BPM | SYSTOLIC BLOOD PRESSURE: 133 MMHG

## 2023-09-11 DIAGNOSIS — L40.9 PSORIASIS: ICD-10-CM

## 2023-09-11 DIAGNOSIS — R94.31 QT PROLONGATION: ICD-10-CM

## 2023-09-11 DIAGNOSIS — E03.9 ACQUIRED HYPOTHYROIDISM: Primary | ICD-10-CM

## 2023-09-11 DIAGNOSIS — D50.9 IRON DEFICIENCY ANEMIA, UNSPECIFIED IRON DEFICIENCY ANEMIA TYPE: ICD-10-CM

## 2023-09-11 PROCEDURE — 99214 OFFICE O/P EST MOD 30 MIN: CPT | Performed by: INTERNAL MEDICINE

## 2023-09-11 PROCEDURE — 93005 ELECTROCARDIOGRAM TRACING: CPT | Performed by: INTERNAL MEDICINE

## 2023-09-11 PROCEDURE — 99212 OFFICE O/P EST SF 10 MIN: CPT | Performed by: INTERNAL MEDICINE

## 2023-09-11 PROCEDURE — 36415 COLL VENOUS BLD VENIPUNCTURE: CPT | Performed by: INTERNAL MEDICINE

## 2023-09-11 PROCEDURE — 93010 ELECTROCARDIOGRAM REPORT: CPT | Performed by: INTERNAL MEDICINE

## 2023-09-11 RX ORDER — CLOBETASOL PROPIONATE 0.5 MG/G
OINTMENT TOPICAL
COMMUNITY
Start: 2023-08-01

## 2023-09-11 RX ORDER — DOXYCYCLINE HYCLATE 100 MG/1
CAPSULE ORAL
COMMUNITY
Start: 2023-08-30

## 2023-09-11 RX ORDER — DROSPIRENONE AND ETHINYL ESTRADIOL 0.03MG-3MG
KIT ORAL
COMMUNITY
Start: 2023-08-16

## 2023-09-11 NOTE — PROGRESS NOTES
[id number] [facility] Internal Medicine      SUBJECTIVE:  Lisa Maddox (:  1995) is a 33 y.o. female here for evaluation of the following chief complaint(s):  Anemia, Hypothyroidism, and Psoriasis  Prediabetes - HBA1c - 5.7 in . Need to recheck HbA1c in     Hypothyroidism - on 50 mcg levothyroxine. Admits to taking 2-3 doses per week. Check TSH though likely no change in medications without consistent medication use. Anemia - Taking iron therapy. Recheck CBC today. Will decide on ongoing iron based on results of CBC    VINNY - on CPAP and was doing well with this at last visit. Continue same. Anxiety - On escitalopram and lorazepam, 0.5 mg 1/2 - 1 tablet daily. Takes just a few times per month. Continue management per psychiatry and continue counseling. ADHD - was not taking any medications at last visit. Was seen this morning by psychiatry. Needs EKG -- due to increased dose of lexapro   Remains on Ritalin, 10 mg once daily and Vyvanse 30 mg once daily    Is taking OCP for heavy periods. Started these in the spring. No breakthrough bleeding. Periods are improved with lighter flow and decreased clots. Management per GYN. Psoriasis - worse on hands right now. [facility]. Is on an antibiotic at this time. Had a clinical trial offered to her. Also has areas on her feet and elbow. Referral placed to Dr. Hedy Herbert for another opinion on treatment. Obesity - discussed medication treatment options today. Based on interactions and current medications for anxiety, I do not think phentermine or Qsymia which has phentermine component would be good choices for Sorin Mohantor. Contrave with interactions as well as semaglutide. Semaglutide interactions less concerning and if a medication to be chosen, would choose this as the first choice.   Since Sorin Mccollum is just starting Vyvanse, will await 6 weeks of

## 2023-09-12 ENCOUNTER — TELEPHONE (OUTPATIENT)
Dept: INTERNAL MEDICINE | Age: 28
End: 2023-09-12

## 2023-09-12 LAB
ABSOLUTE IMMATURE GRANULOCYTE: <0.03 K/UL (ref 0–0.58)
BASOPHILS ABSOLUTE: 0.03 K/UL (ref 0–0.2)
BASOPHILS RELATIVE PERCENT: 0 % (ref 0–2)
EOSINOPHILS ABSOLUTE: 0.23 K/UL (ref 0.05–0.5)
EOSINOPHILS RELATIVE PERCENT: 3 % (ref 0–6)
HCT VFR BLD CALC: 35.1 % (ref 34–48)
HEMOGLOBIN: 10.7 G/DL (ref 11.5–15.5)
IMMATURE GRANULOCYTES: 0 % (ref 0–5)
LYMPHOCYTES ABSOLUTE: 2.54 K/UL (ref 1.5–4)
LYMPHOCYTES RELATIVE PERCENT: 29 % (ref 20–42)
MCH RBC QN AUTO: 22.1 PG (ref 26–35)
MCHC RBC AUTO-ENTMCNC: 30.5 G/DL (ref 32–34.5)
MCV RBC AUTO: 72.4 FL (ref 80–99.9)
MONOCYTES ABSOLUTE: 0.58 K/UL (ref 0.1–0.95)
MONOCYTES RELATIVE PERCENT: 7 % (ref 2–12)
NEUTROPHILS ABSOLUTE: 5.39 K/UL (ref 1.8–7.3)
NEUTROPHILS RELATIVE PERCENT: 61 % (ref 43–80)
PDW BLD-RTO: 16.5 % (ref 11.5–15)
PLATELET # BLD: 362 K/UL (ref 130–450)
PMV BLD AUTO: 8.7 FL (ref 7–12)
RBC # BLD: 4.85 M/UL (ref 3.5–5.5)
TSH SERPL DL<=0.05 MIU/L-ACNC: 2.88 UIU/ML (ref 0.27–4.2)
WBC # BLD: 8.8 K/UL (ref 4.5–11.5)

## 2023-09-12 NOTE — TELEPHONE ENCOUNTER
Notified patient to please call Dr. Dontrell Warren office to schedule new patient appointment. This is the requirements for new patients per Dr. Catherine Herndon office. Details given to patient. Patient advised, verbalized understanding.

## 2023-09-18 ENCOUNTER — TELEPHONE (OUTPATIENT)
Dept: INTERNAL MEDICINE | Age: 28
End: 2023-09-18

## 2023-09-18 NOTE — TELEPHONE ENCOUNTER
----- Message from Juno Osullivan MD sent at 9/18/2023  8:44 AM EDT -----  Please let Camden Soares know that her blood counts show that she still has anemia. She should continue daily iron therapy. If she tolerates, can increase to twice daily.      Juno Osullivan MD  9/18/2023

## 2023-10-12 ENCOUNTER — TELEPHONE (OUTPATIENT)
Dept: INTERNAL MEDICINE | Age: 28
End: 2023-10-12

## 2023-10-12 RX ORDER — SEMAGLUTIDE 0.25 MG/.5ML
0.25 INJECTION, SOLUTION SUBCUTANEOUS
Qty: 2 ML | Refills: 0 | Status: SHIPPED
Start: 2023-10-12 | End: 2024-03-11 | Stop reason: SDUPTHER

## 2023-11-01 DIAGNOSIS — E03.9 ACQUIRED HYPOTHYROIDISM: ICD-10-CM

## 2023-11-01 RX ORDER — LEVOTHYROXINE SODIUM 0.05 MG/1
50 TABLET ORAL DAILY
Qty: 90 TABLET | Refills: 1 | Status: SHIPPED | OUTPATIENT
Start: 2023-11-01

## 2023-11-07 ENCOUNTER — TELEPHONE (OUTPATIENT)
Dept: INTERNAL MEDICINE | Age: 28
End: 2023-11-07

## 2023-11-07 NOTE — TELEPHONE ENCOUNTER
Call placed to [employer]. To appeal denial of Wegovy. Unable to prescribe phentermine or Qsymia as options with anxiety. Lelo Ramos does not  have DM, though she is prediabetic and she is not hypertensive. Current BMI does meet indications for this prescription. The agent has requested my clinical note with Clear BMI. Note from last office visit addended and will be sent to [facility] at the following fax:  [phone]. Reference #[id number].     Shelia Humphreys MD  11/7/2023

## 2024-01-29 ENCOUNTER — OFFICE VISIT (OUTPATIENT)
Dept: INTERNAL MEDICINE | Age: 29
End: 2024-01-29
Payer: COMMERCIAL

## 2024-01-29 VITALS — RESPIRATION RATE: 20 BRPM | HEART RATE: 94 BPM | TEMPERATURE: 97.1 F | OXYGEN SATURATION: 97 %

## 2024-01-29 DIAGNOSIS — J39.8 CONGESTION OF UPPER RESPIRATORY TRACT: Primary | ICD-10-CM

## 2024-01-29 LAB
INFLUENZA A ANTIGEN, POC: NEGATIVE
INFLUENZA B ANTIGEN, POC: NEGATIVE
LOT EXPIRE DATE: NORMAL
LOT KIT NUMBER: NORMAL
S PYO AG THROAT QL: NORMAL
SARS-COV-2, POC: NORMAL
VALID INTERNAL CONTROL: NORMAL
VENDOR AND KIT NAME POC: NORMAL

## 2024-01-29 PROCEDURE — 87428 SARSCOV & INF VIR A&B AG IA: CPT | Performed by: INTERNAL MEDICINE

## 2024-01-29 PROCEDURE — 87880 STREP A ASSAY W/OPTIC: CPT | Performed by: INTERNAL MEDICINE

## 2024-01-29 PROCEDURE — 99213 OFFICE O/P EST LOW 20 MIN: CPT | Performed by: INTERNAL MEDICINE

## 2024-01-29 RX ORDER — AZITHROMYCIN 250 MG/1
250 TABLET, FILM COATED ORAL SEE ADMIN INSTRUCTIONS
Qty: 6 TABLET | Refills: 0 | Status: SHIPPED | OUTPATIENT
Start: 2024-01-29 | End: 2024-02-03

## 2024-01-29 RX ORDER — PREDNISONE 20 MG/1
TABLET ORAL
Qty: 7 TABLET | Refills: 0 | Status: SHIPPED | OUTPATIENT
Start: 2024-01-29

## 2024-01-29 NOTE — PATIENT INSTRUCTIONS
Sinusitis:  Use daily loratadine - D, One pill once a day.   Start antibiotic - azithromycin.  Take prednisone according to instructions.   If you want, you can use OTC Flonase as well.

## 2024-01-29 NOTE — PROGRESS NOTES
[facility] - [facility] Internal Medicine      SUBJECTIVE:  Keesha Whitley (:  1995) is a 28 y.o. female here for evaluation of the following chief complaint(s):  Chest Congestion  Has been experiencing a cough for a month.  Now with congestion, ears popping as well as decreased taste and smell secondary to this congestion. Went to urgent care on 2023 and symptoms have worsened.  Has tried OTC cold medications without relief - acetaminophen, Dextromethorphan, Guaifenecin, phenylephrine.  + sputum production which is yellow. + sinus pressure as well. . Occasional feelings of fevers and chills.  Has noticed some wheezes as well. + HA, no sore throat.  + ear congestion.   COVID/FLU and Strep all negative.    Given (+) sinus tenderness and length of symptoms, will treat with antibiotics and steroids.    Recommend that Keesha obtain OTC loratadine-D as well as OTC flonase as necessary.     Review of Systems as above.     Current Outpatient Medications on File Prior to Visit   Medication Sig Dispense Refill    VYVANSE 30 MG capsule Take 1 capsule by mouth every morning. Max Daily Amount: 30 mg      levothyroxine (SYNTHROID) 50 MCG tablet Take 1 tablet by mouth Daily 90 tablet 1    Semaglutide-Weight Management (WEGOVY) 0.25 MG/0.5ML SOAJ SC injection Inject 0.25 mg into the skin every 7 days 2 mL 0    Lisdexamfetamine Dimesylate (VYVANSE) 20 MG CAPS Take 1 capsule by mouth daily. Once daily (counselor)      drospirenone-ethinyl estradiol 3-0.03 MG TABS TAKE 1 TABLET BY MOUTH AS DIRECTED      doxycycline hyclate (VIBRAMYCIN) 100 MG capsule       clobetasol (TEMOVATE) 0.05 % ointment APPLY OINTMENT TOPICALLY TO AFFECTED AREA TWICE DAILY FOR 2 WEEKS THEN OFF 1 WEEK, THEN CYCLE AS NEEDED      ferrous sulfate (IRON 325) 325 (65 Fe) MG tablet Take 1 tablet by mouth daily (with breakfast)      LORazepam (ATIVAN) 0.5 MG tablet TAKE 1/2 TO 1 (ONE-HALF TO ONE) TABLET BY MOUTH AS NEEDED FOR

## 2024-02-07 ENCOUNTER — TELEPHONE (OUTPATIENT)
Dept: INTERNAL MEDICINE | Age: 29
End: 2024-02-07

## 2024-02-07 DIAGNOSIS — R05.2 SUBACUTE COUGH: Primary | ICD-10-CM

## 2024-02-07 RX ORDER — FEXOFENADINE HCL 180 MG/1
180 TABLET ORAL DAILY
Qty: 30 TABLET | Refills: 0 | Status: SHIPPED | OUTPATIENT
Start: 2024-02-07 | End: 2024-03-08

## 2024-02-07 RX ORDER — MONTELUKAST SODIUM 10 MG/1
10 TABLET ORAL NIGHTLY
Qty: 30 TABLET | Refills: 0 | Status: SHIPPED
Start: 2024-02-07 | End: 2024-03-07

## 2024-02-07 NOTE — TELEPHONE ENCOUNTER
Continues to have pressure in her head and + cough.  Continues to have congestion.  Still with difficulty smelling and tasting. Taking Claritin -D daily.  States that this has not helped much.  Using flonase nasal spray.    Swtich to allegra   Continue flonase   Start singulair.    Start atrovent.

## 2024-02-08 RX ORDER — FEXOFENADINE HYDROCHLORIDE 180 MG/1
180 TABLET, FILM COATED ORAL DAILY
Qty: 90 TABLET | OUTPATIENT
Start: 2024-02-08

## 2024-02-08 RX ORDER — MONTELUKAST SODIUM 10 MG/1
10 TABLET ORAL NIGHTLY
Qty: 90 TABLET | OUTPATIENT
Start: 2024-02-08

## 2024-03-06 ENCOUNTER — TELEMEDICINE (OUTPATIENT)
Dept: SLEEP CENTER | Age: 29
End: 2024-03-06
Payer: COMMERCIAL

## 2024-03-06 ENCOUNTER — TELEPHONE (OUTPATIENT)
Dept: SLEEP CENTER | Age: 29
End: 2024-03-06

## 2024-03-06 DIAGNOSIS — G47.33 OBSTRUCTIVE SLEEP APNEA: Primary | ICD-10-CM

## 2024-03-06 PROCEDURE — 99213 OFFICE O/P EST LOW 20 MIN: CPT | Performed by: NURSE PRACTITIONER

## 2024-03-06 ASSESSMENT — SLEEP AND FATIGUE QUESTIONNAIRES
HOW LIKELY ARE YOU TO NOD OFF OR FALL ASLEEP WHILE SITTING AND TALKING TO SOMEONE: 0
HOW LIKELY ARE YOU TO NOD OFF OR FALL ASLEEP WHILE WATCHING TV: 2
HOW LIKELY ARE YOU TO NOD OFF OR FALL ASLEEP IN A CAR, WHILE STOPPED FOR A FEW MINUTES IN TRAFFIC: 1
HOW LIKELY ARE YOU TO NOD OFF OR FALL ASLEEP WHILE LYING DOWN TO REST IN THE AFTERNOON WHEN CIRCUMSTANCES PERMIT: 3
HOW LIKELY ARE YOU TO NOD OFF OR FALL ASLEEP WHEN YOU ARE A PASSENGER IN A CAR FOR AN HOUR WITHOUT A BREAK: 2
ESS TOTAL SCORE: 12
HOW LIKELY ARE YOU TO NOD OFF OR FALL ASLEEP WHILE SITTING AND READING: 1
HOW LIKELY ARE YOU TO NOD OFF OR FALL ASLEEP WHILE SITTING INACTIVE IN A PUBLIC PLACE: 1
HOW LIKELY ARE YOU TO NOD OFF OR FALL ASLEEP WHILE SITTING QUIETLY AFTER LUNCH WITHOUT ALCOHOL: 2

## 2024-03-06 NOTE — PROGRESS NOTES
0    ipratropium (ATROVENT HFA) 17 MCG/ACT inhaler, Inhale 2 puffs into the lungs every 6 hours, Disp: 1 each, Rfl: 1    montelukast (SINGULAIR) 10 MG tablet, Take 1 tablet by mouth nightly, Disp: 30 tablet, Rfl: 0    predniSONE (DELTASONE) 20 MG tablet, Take 2 tablets by mouth once daily for 2 days then take 1 tablet by mouth once daily for 3 days., Disp: 7 tablet, Rfl: 0    VYVANSE 30 MG capsule, Take 1 capsule by mouth every morning. Max Daily Amount: 30 mg, Disp: , Rfl:     levothyroxine (SYNTHROID) 50 MCG tablet, Take 1 tablet by mouth Daily, Disp: 90 tablet, Rfl: 1    Semaglutide-Weight Management (WEGOVY) 0.25 MG/0.5ML SOAJ SC injection, Inject 0.25 mg into the skin every 7 days, Disp: 2 mL, Rfl: 0    Lisdexamfetamine Dimesylate (VYVANSE) 20 MG CAPS, Take 1 capsule by mouth daily. Once daily (counselor), Disp: , Rfl:     drospirenone-ethinyl estradiol 3-0.03 MG TABS, TAKE 1 TABLET BY MOUTH AS DIRECTED, Disp: , Rfl:     doxycycline hyclate (VIBRAMYCIN) 100 MG capsule, , Disp: , Rfl:     clobetasol (TEMOVATE) 0.05 % ointment, APPLY OINTMENT TOPICALLY TO AFFECTED AREA TWICE DAILY FOR 2 WEEKS THEN OFF 1 WEEK, THEN CYCLE AS NEEDED, Disp: , Rfl:     ferrous sulfate (IRON 325) 325 (65 Fe) MG tablet, Take 1 tablet by mouth daily (with breakfast), Disp: , Rfl:     LORazepam (ATIVAN) 0.5 MG tablet, TAKE 1/2 TO 1 (ONE-HALF TO ONE) TABLET BY MOUTH AS NEEDED FOR SEVERE ANXIETY, MAX OF 1 TAB PER DAY, Disp: , Rfl:     methylphenidate (RITALIN) 10 MG tablet, Take 1 tablet by mouth daily. Once daily, Disp: , Rfl:     escitalopram (LEXAPRO) 20 MG tablet, Take 2 tablets by mouth daily, Disp: , Rfl:         3/6/2024     2:01 PM 3/1/2023     7:52 AM 8/15/2022     3:46 PM 3/15/2022    10:52 AM 3/1/2019    12:47 PM 12/20/2018     4:28 AM 12/20/2018     4:26 AM   Sleep Medicine   Sitting and reading 1 1 1 2 3 3    Watching TV 2 1 1 3 3 3    Sitting, inactive in a public place (e.g. a theatre or a meeting) 1 1 0 2 2 2    As a

## 2024-03-07 RX ORDER — MONTELUKAST SODIUM 10 MG/1
10 TABLET ORAL NIGHTLY
Qty: 90 TABLET | Refills: 1 | Status: SHIPPED | OUTPATIENT
Start: 2024-03-07

## 2024-03-11 ENCOUNTER — OFFICE VISIT (OUTPATIENT)
Dept: INTERNAL MEDICINE | Age: 29
End: 2024-03-11
Payer: COMMERCIAL

## 2024-03-11 VITALS
WEIGHT: 293 LBS | SYSTOLIC BLOOD PRESSURE: 134 MMHG | HEIGHT: 66 IN | TEMPERATURE: 97.2 F | RESPIRATION RATE: 18 BRPM | HEART RATE: 101 BPM | BODY MASS INDEX: 47.09 KG/M2 | DIASTOLIC BLOOD PRESSURE: 82 MMHG | OXYGEN SATURATION: 98 %

## 2024-03-11 DIAGNOSIS — E03.9 ACQUIRED HYPOTHYROIDISM: ICD-10-CM

## 2024-03-11 DIAGNOSIS — D50.0 IRON DEFICIENCY ANEMIA DUE TO CHRONIC BLOOD LOSS: ICD-10-CM

## 2024-03-11 DIAGNOSIS — R73.03 PREDIABETES: Primary | ICD-10-CM

## 2024-03-11 DIAGNOSIS — E66.01 MORBID OBESITY (HCC): ICD-10-CM

## 2024-03-11 LAB
ABSOLUTE IMMATURE GRANULOCYTE: <0.03 K/UL (ref 0–0.58)
BASOPHILS ABSOLUTE: 0.03 K/UL (ref 0–0.2)
BASOPHILS RELATIVE PERCENT: 0 % (ref 0–2)
EOSINOPHILS ABSOLUTE: 0.21 K/UL (ref 0.05–0.5)
EOSINOPHILS RELATIVE PERCENT: 3 % (ref 0–6)
FERRITIN: 38 NG/ML
HBA1C MFR BLD: 5.8 %
HCT VFR BLD CALC: 39 % (ref 34–48)
HEMOGLOBIN: 11.6 G/DL (ref 11.5–15.5)
IMMATURE GRANULOCYTES: 0 % (ref 0–5)
IRON % SATURATION: 7 % (ref 15–50)
IRON: 27 UG/DL (ref 37–145)
LYMPHOCYTES ABSOLUTE: 2.62 K/UL (ref 1.5–4)
LYMPHOCYTES RELATIVE PERCENT: 33 % (ref 20–42)
MCH RBC QN AUTO: 21.9 PG (ref 26–35)
MCHC RBC AUTO-ENTMCNC: 29.7 G/DL (ref 32–34.5)
MCV RBC AUTO: 73.7 FL (ref 80–99.9)
MONOCYTES ABSOLUTE: 0.44 K/UL (ref 0.1–0.95)
MONOCYTES RELATIVE PERCENT: 6 % (ref 2–12)
NEUTROPHILS ABSOLUTE: 4.7 K/UL (ref 1.8–7.3)
NEUTROPHILS RELATIVE PERCENT: 59 % (ref 43–80)
PDW BLD-RTO: 16.3 % (ref 11.5–15)
PLATELET # BLD: 325 K/UL (ref 130–450)
PMV BLD AUTO: 8.6 FL (ref 7–12)
RBC # BLD: 5.29 M/UL (ref 3.5–5.5)
TOTAL IRON BINDING CAPACITY: 385 UG/DL (ref 250–450)
TSH SERPL DL<=0.05 MIU/L-ACNC: 1.83 UIU/ML (ref 0.27–4.2)
WBC # BLD: 8 K/UL (ref 4.5–11.5)

## 2024-03-11 PROCEDURE — 99212 OFFICE O/P EST SF 10 MIN: CPT | Performed by: INTERNAL MEDICINE

## 2024-03-11 PROCEDURE — 83036 HEMOGLOBIN GLYCOSYLATED A1C: CPT | Performed by: INTERNAL MEDICINE

## 2024-03-11 PROCEDURE — 99214 OFFICE O/P EST MOD 30 MIN: CPT | Performed by: INTERNAL MEDICINE

## 2024-03-11 PROCEDURE — 36415 COLL VENOUS BLD VENIPUNCTURE: CPT | Performed by: INTERNAL MEDICINE

## 2024-03-11 RX ORDER — RUXOLITINIB 15 MG/G
CREAM TOPICAL
COMMUNITY
Start: 2023-12-27

## 2024-03-11 RX ORDER — SEMAGLUTIDE 0.25 MG/.5ML
0.25 INJECTION, SOLUTION SUBCUTANEOUS
Qty: 2 ML | Refills: 0 | Status: SHIPPED | OUTPATIENT
Start: 2024-03-11 | End: 2024-04-10

## 2024-03-11 ASSESSMENT — PATIENT HEALTH QUESTIONNAIRE - PHQ9
9. THOUGHTS THAT YOU WOULD BE BETTER OFF DEAD, OR OF HURTING YOURSELF: 0
SUM OF ALL RESPONSES TO PHQ QUESTIONS 1-9: 8
5. POOR APPETITE OR OVEREATING: NEARLY EVERY DAY
1. LITTLE INTEREST OR PLEASURE IN DOING THINGS: SEVERAL DAYS
SUM OF ALL RESPONSES TO PHQ QUESTIONS 1-9: 8
9. THOUGHTS THAT YOU WOULD BE BETTER OFF DEAD, OR OF HURTING YOURSELF: NOT AT ALL
8. MOVING OR SPEAKING SO SLOWLY THAT OTHER PEOPLE COULD HAVE NOTICED. OR THE OPPOSITE - BEING SO FIDGETY OR RESTLESS THAT YOU HAVE BEEN MOVING AROUND A LOT MORE THAN USUAL: NOT AT ALL
6. FEELING BAD ABOUT YOURSELF - OR THAT YOU ARE A FAILURE OR HAVE LET YOURSELF OR YOUR FAMILY DOWN: NOT AT ALL
8. MOVING OR SPEAKING SO SLOWLY THAT OTHER PEOPLE COULD HAVE NOTICED. OR THE OPPOSITE, BEING SO FIGETY OR RESTLESS THAT YOU HAVE BEEN MOVING AROUND A LOT MORE THAN USUAL: 0
SUM OF ALL RESPONSES TO PHQ QUESTIONS 1-9: 8
SUM OF ALL RESPONSES TO PHQ QUESTIONS 1-9: 8
10. IF YOU CHECKED OFF ANY PROBLEMS, HOW DIFFICULT HAVE THESE PROBLEMS MADE IT FOR YOU TO DO YOUR WORK, TAKE CARE OF THINGS AT HOME, OR GET ALONG WITH OTHER PEOPLE: SOMEWHAT DIFFICULT
4. FEELING TIRED OR HAVING LITTLE ENERGY: SEVERAL DAYS
1. LITTLE INTEREST OR PLEASURE IN DOING THINGS: 1
4. FEELING TIRED OR HAVING LITTLE ENERGY: 1
7. TROUBLE CONCENTRATING ON THINGS, SUCH AS READING THE NEWSPAPER OR WATCHING TELEVISION: MORE THAN HALF THE DAYS
3. TROUBLE FALLING OR STAYING ASLEEP: 0
10. IF YOU CHECKED OFF ANY PROBLEMS, HOW DIFFICULT HAVE THESE PROBLEMS MADE IT FOR YOU TO DO YOUR WORK, TAKE CARE OF THINGS AT HOME, OR GET ALONG WITH OTHER PEOPLE: 1
3. TROUBLE FALLING OR STAYING ASLEEP: NOT AT ALL
SUM OF ALL RESPONSES TO PHQ9 QUESTIONS 1 & 2: 2
5. POOR APPETITE OR OVEREATING: 3
2. FEELING DOWN, DEPRESSED OR HOPELESS: 1
SUM OF ALL RESPONSES TO PHQ QUESTIONS 1-9: 8
7. TROUBLE CONCENTRATING ON THINGS, SUCH AS READING THE NEWSPAPER OR WATCHING TELEVISION: 2
2. FEELING DOWN, DEPRESSED OR HOPELESS: SEVERAL DAYS
6. FEELING BAD ABOUT YOURSELF - OR THAT YOU ARE A FAILURE OR HAVE LET YOURSELF OR YOUR FAMILY DOWN: 0

## 2024-03-11 NOTE — PROGRESS NOTES
[facility] - [facility] Internal Medicine      SUBJECTIVE:  Keesha Whitley (:  1995) is a 28 y.o. female here for evaluation of the following chief complaint(s):  Follow-up (Pt states there are no new updates at this time.) and Hypothyroidism  Hypothyroidism - on 50 mcg levothyroxine. TSH normal on 2023.  Taking medication daily.    Recheck TSH level     Prediabetes - HbA1c needs rechecked.  Today, HbA1c is 5.8.     Recheck in 1 year.    Offered dietician consult and DM education.  Keesha would like to defer at present in an effort to begin semaglutide.     Anemia - was to be on iron therapy.  CBC still with mild anemia.  Was advised to continue iron therapy.  Has had a hard time remembering to take iron daily.    Check CBC and advise based on these results.     VINNY - on CPAP.  Had sleep medicine virtual visit. Had a virtual visit on 3/6/2024.  No changes were made to settings at the time and she is to follow up in 1 year.    Continue current CPAP settings as per sleep medicine.     Anxiety - continues on escitalopram, 40 mg (2 tablets)  and lorazepam.  Continues in counseling and follow-up with psychiatry.  Feels that she is doing well. Follows with Gume Dwyer.    Continue medication as per psychiatry.      ADHD - on medication per psychiatry. On ritalin, 10 mg once daily.  Has not xochitl taking this as this worsened anxiety.      Psoriasis - was referred for a second opinion to Dr. Zari Mera at last visit.  Was offered a clinical trial by [facility]. Still getting flares.  Multiple ointments. Doxy has not worked at this time. New ointment was prescribed but does not help. Will be staying with [facility].     Obesity - semaglutide therapy has been approved, but Keesha has not been able to find this from the pharmacy. Keesha is still interested in starting this therapy.  We discussed that this is not compatible with pregnancy if Keesha should desire

## 2024-03-11 NOTE — PROGRESS NOTES
Pt received venipuncture blood draw right ac area. Tolerated well with  no adverse reactions. Sent one green and one lavender tube to lab.

## 2024-04-15 LAB — PAP SMEAR, EXTERNAL: NORMAL

## 2024-04-18 LAB — PAP SMEAR, EXTERNAL: NORMAL

## 2024-04-25 DIAGNOSIS — E03.9 ACQUIRED HYPOTHYROIDISM: ICD-10-CM

## 2024-04-26 RX ORDER — LEVOTHYROXINE SODIUM 0.05 MG/1
50 TABLET ORAL DAILY
Qty: 90 TABLET | Refills: 1 | Status: SHIPPED | OUTPATIENT
Start: 2024-04-26

## 2024-09-05 DIAGNOSIS — E03.9 ACQUIRED HYPOTHYROIDISM: ICD-10-CM

## 2024-09-05 NOTE — TELEPHONE ENCOUNTER
Last Appointment:  3/11/2024  Future Appointments   Date Time Provider Department Center   3/19/2025  3:20 PM Adri Joy APRN - CNP [facility] SLEEP [facility]

## 2024-09-06 RX ORDER — LEVOTHYROXINE SODIUM 50 UG/1
50 TABLET ORAL DAILY
Qty: 90 TABLET | Refills: 1 | Status: SHIPPED | OUTPATIENT
Start: 2024-09-06

## 2024-11-12 ENCOUNTER — OFFICE VISIT (OUTPATIENT)
Dept: PRIMARY CARE CLINIC | Age: 29
End: 2024-11-12

## 2024-11-12 VITALS
RESPIRATION RATE: 16 BRPM | HEIGHT: 66 IN | TEMPERATURE: 99.1 F | OXYGEN SATURATION: 99 % | SYSTOLIC BLOOD PRESSURE: 146 MMHG | WEIGHT: 293 LBS | DIASTOLIC BLOOD PRESSURE: 85 MMHG | HEART RATE: 98 BPM | BODY MASS INDEX: 47.09 KG/M2

## 2024-11-12 DIAGNOSIS — R05.9 COUGH IN ADULT: ICD-10-CM

## 2024-11-12 DIAGNOSIS — U07.1 COVID-19: Primary | ICD-10-CM

## 2024-11-12 LAB
INFLUENZA A ANTIBODY: NORMAL
INFLUENZA B ANTIBODY: NORMAL
Lab: ABNORMAL
PERFORMING INSTRUMENT: ABNORMAL
QC PASS/FAIL: ABNORMAL
SARS-COV-2, POC: DETECTED

## 2024-11-12 RX ORDER — FLUTICASONE PROPIONATE 50 MCG
1 SPRAY, SUSPENSION (ML) NASAL 2 TIMES DAILY
Qty: 16 G | Refills: 0 | Status: SHIPPED | OUTPATIENT
Start: 2024-11-12

## 2024-11-12 RX ORDER — GUAIFENESIN AND DEXTROMETHORPHAN HYDROBROMIDE 600; 30 MG/1; MG/1
1 TABLET, EXTENDED RELEASE ORAL 2 TIMES DAILY
Qty: 28 TABLET | Refills: 0 | Status: SHIPPED | OUTPATIENT
Start: 2024-11-12 | End: 2024-11-26

## 2024-11-12 NOTE — PROGRESS NOTES
Chief Complaint   Cough (Cough, congestion, HA and body ache x3 days.)    History of Present Illness   Source of history provided by:  patient.     Keesha Whitley is a 29 y.o. old female who presents to walk-in with complaints of Generalized Body Aches, Headache, Pharyngitis, Rhinorrhea, Nasal Congestion, Post nasal drip, productive Cough, Chest congestion, and Fatigue x 4 days. States symptoms have progressively worsened since onset. Has been taking nothing for the symptoms. Currently denies any Fever, Shortness of breath, Nausea, Vomiting, Chest Pain, Abdominal Pain, Rash, or Close contact with a lab confirmed COVID-19 patient within 14 days of symptom onset . Denies any hx of asthma. Denies tobacco use.  Patient reports history of COVID-19.  Patient is  vaccinated for COVID-19.    ROS   Unless otherwise stated in this report or unable to obtain because of the patient's clinical or mental status as evidenced by the medical record, this patients's positive and negative responses for Review of Systems, constitutional, psych, eyes, ENT, cardiovascular, respiratory, gastrointestinal, neurological, genitourinary, musculoskeletal, integument systems and systems related to the presenting problem are either stated in the preceding or were not pertinent or were negative for the symptoms and/or complaints related to the medical problem.    Past Medical History:  has a past medical history of ADHD, Anxiety, and Hypothyroidism.   Past Surgical History:  has no past surgical history on file.  Social History:  reports that she has never smoked. She has never used smokeless tobacco. She reports that she does not drink alcohol and does not use drugs.  Family History: family history includes ADHD in her brother and father; Attention Deficit Disorder in her brother; Bipolar Disorder in her father; No Known Problems in her sister and sister; Pancreatic Cancer in her paternal uncle; Sleep Apnea in her father; Thyroid Disease in

## 2024-12-30 RX ORDER — CLOBETASOL PROPIONATE 0.05 G/100ML
SHAMPOO TOPICAL
COMMUNITY
Start: 2024-11-01

## 2025-01-20 ENCOUNTER — PATIENT MESSAGE (OUTPATIENT)
Dept: INTERNAL MEDICINE | Age: 30
End: 2025-01-20

## 2025-02-25 DIAGNOSIS — E03.9 ACQUIRED HYPOTHYROIDISM: ICD-10-CM

## 2025-03-03 RX ORDER — LEVOTHYROXINE SODIUM 50 UG/1
50 TABLET ORAL DAILY
Qty: 90 TABLET | Refills: 1 | Status: SHIPPED | OUTPATIENT
Start: 2025-03-03

## 2025-03-16 ASSESSMENT — PATIENT HEALTH QUESTIONNAIRE - PHQ9
5. POOR APPETITE OR OVEREATING: NOT AT ALL
1. LITTLE INTEREST OR PLEASURE IN DOING THINGS: NOT AT ALL
2. FEELING DOWN, DEPRESSED OR HOPELESS: NOT AT ALL
SUM OF ALL RESPONSES TO PHQ QUESTIONS 1-9: 1
3. TROUBLE FALLING OR STAYING ASLEEP: NOT AT ALL
7. TROUBLE CONCENTRATING ON THINGS, SUCH AS READING THE NEWSPAPER OR WATCHING TELEVISION: NOT AT ALL
SUM OF ALL RESPONSES TO PHQ QUESTIONS 1-9: 1
SUM OF ALL RESPONSES TO PHQ QUESTIONS 1-9: 1
2. FEELING DOWN, DEPRESSED OR HOPELESS: NOT AT ALL
SUM OF ALL RESPONSES TO PHQ QUESTIONS 1-9: 1
4. FEELING TIRED OR HAVING LITTLE ENERGY: SEVERAL DAYS
10. IF YOU CHECKED OFF ANY PROBLEMS, HOW DIFFICULT HAVE THESE PROBLEMS MADE IT FOR YOU TO DO YOUR WORK, TAKE CARE OF THINGS AT HOME, OR GET ALONG WITH OTHER PEOPLE: NOT DIFFICULT AT ALL
9. THOUGHTS THAT YOU WOULD BE BETTER OFF DEAD, OR OF HURTING YOURSELF: NOT AT ALL
10. IF YOU CHECKED OFF ANY PROBLEMS, HOW DIFFICULT HAVE THESE PROBLEMS MADE IT FOR YOU TO DO YOUR WORK, TAKE CARE OF THINGS AT HOME, OR GET ALONG WITH OTHER PEOPLE: NOT DIFFICULT AT ALL
3. TROUBLE FALLING OR STAYING ASLEEP: NOT AT ALL
9. THOUGHTS THAT YOU WOULD BE BETTER OFF DEAD, OR OF HURTING YOURSELF: NOT AT ALL
6. FEELING BAD ABOUT YOURSELF - OR THAT YOU ARE A FAILURE OR HAVE LET YOURSELF OR YOUR FAMILY DOWN: NOT AT ALL
8. MOVING OR SPEAKING SO SLOWLY THAT OTHER PEOPLE COULD HAVE NOTICED. OR THE OPPOSITE - BEING SO FIDGETY OR RESTLESS THAT YOU HAVE BEEN MOVING AROUND A LOT MORE THAN USUAL: NOT AT ALL
SUM OF ALL RESPONSES TO PHQ QUESTIONS 1-9: 1
6. FEELING BAD ABOUT YOURSELF - OR THAT YOU ARE A FAILURE OR HAVE LET YOURSELF OR YOUR FAMILY DOWN: NOT AT ALL
1. LITTLE INTEREST OR PLEASURE IN DOING THINGS: NOT AT ALL
8. MOVING OR SPEAKING SO SLOWLY THAT OTHER PEOPLE COULD HAVE NOTICED. OR THE OPPOSITE, BEING SO FIGETY OR RESTLESS THAT YOU HAVE BEEN MOVING AROUND A LOT MORE THAN USUAL: NOT AT ALL
4. FEELING TIRED OR HAVING LITTLE ENERGY: SEVERAL DAYS
7. TROUBLE CONCENTRATING ON THINGS, SUCH AS READING THE NEWSPAPER OR WATCHING TELEVISION: NOT AT ALL
5. POOR APPETITE OR OVEREATING: NOT AT ALL

## 2025-03-18 NOTE — PROGRESS NOTES
[facility] Internal Medicine      SUBJECTIVE:  Keesha Whitley (:  1995) is a 29 y.o. female here for evaluation of the following chief complaint(s):  Hypothyroidism    Assessment & Plan  1. Weight management.  She has been experiencing success with her current diet and exercise regimen, which includes kickboxing 3 days a week and walking 2-3 days a week. She has decided to continue with this approach rather than pursuing medication treatments. She was advised to maintain her current lifestyle modifications. She was encouraged to persist with her efforts, even when encountering plateaus or challenging days.    2. Iron deficiency.  If her iron levels are found to be low, a course of intravenous iron treatments will be considered, bypassing the gastrointestinal tract to avoid constipation. A blood count and iron studies will be conducted to assess her iron levels.    3. Anxiety.  She is currently taking Lexapro 40 mg daily and lorazepam as needed, although she has not required lorazepam recently. She continues to follow up with her psychiatrist, Dr. White, in [place] annually.    4. ADHD.  She has discontinued her Vyvanse medication due to exacerbation of anxiety symptoms. She is currently managing her ADHD with non-pharmacological strategies such as using reminder apps, writing notes, and making checklists.    5. Psoriasis.  She has tried various topical treatments, including clobetasol ointment and shampoo, but has not found significant relief. She was advised to schedule an appointment with [facility] for further management. The possibility of exploring other topical treatments will be investigated.    6. CPAP management.  She has a virtual visit scheduled in 2025. She was informed that her CPAP mask may require refitting due to changes in facial structure resulting from weight loss. She was advised to contact the company for a mask refit if she

## 2025-03-19 ENCOUNTER — OFFICE VISIT (OUTPATIENT)
Dept: INTERNAL MEDICINE | Age: 30
End: 2025-03-19
Payer: COMMERCIAL

## 2025-03-19 VITALS
WEIGHT: 293 LBS | TEMPERATURE: 97.6 F | HEART RATE: 80 BPM | DIASTOLIC BLOOD PRESSURE: 73 MMHG | RESPIRATION RATE: 20 BRPM | SYSTOLIC BLOOD PRESSURE: 122 MMHG | BODY MASS INDEX: 47.09 KG/M2 | OXYGEN SATURATION: 96 % | HEIGHT: 66 IN

## 2025-03-19 DIAGNOSIS — E66.01 MORBID OBESITY: ICD-10-CM

## 2025-03-19 DIAGNOSIS — L40.9 PSORIASIS: ICD-10-CM

## 2025-03-19 DIAGNOSIS — D50.0 IRON DEFICIENCY ANEMIA DUE TO CHRONIC BLOOD LOSS: ICD-10-CM

## 2025-03-19 DIAGNOSIS — F41.9 ANXIETY AND DEPRESSION: ICD-10-CM

## 2025-03-19 DIAGNOSIS — R73.03 PREDIABETES: ICD-10-CM

## 2025-03-19 DIAGNOSIS — E03.9 ACQUIRED HYPOTHYROIDISM: ICD-10-CM

## 2025-03-19 DIAGNOSIS — F32.A ANXIETY AND DEPRESSION: ICD-10-CM

## 2025-03-19 DIAGNOSIS — F90.9 ATTENTION DEFICIT HYPERACTIVITY DISORDER (ADHD), UNSPECIFIED ADHD TYPE: ICD-10-CM

## 2025-03-19 DIAGNOSIS — E03.9 ACQUIRED HYPOTHYROIDISM: Primary | ICD-10-CM

## 2025-03-19 LAB
BASOPHILS ABSOLUTE: 0.03 K/UL (ref 0–0.2)
BASOPHILS RELATIVE PERCENT: 1 % (ref 0–2)
CHOLESTEROL, FASTING: 161 MG/DL
EOSINOPHILS ABSOLUTE: 0.2 K/UL (ref 0.05–0.5)
EOSINOPHILS RELATIVE PERCENT: 3 % (ref 0–6)
FERRITIN: 23 NG/ML
HBA1C MFR BLD: 5.4 %
HCT VFR BLD CALC: 40.3 % (ref 34–48)
HDLC SERPL-MCNC: 38 MG/DL
HEMOGLOBIN: 12.2 G/DL (ref 11.5–15.5)
IMMATURE GRANULOCYTES %: 0 % (ref 0–5)
IMMATURE GRANULOCYTES ABSOLUTE: <0.03 K/UL (ref 0–0.58)
IRON % SATURATION: 9 % (ref 15–50)
IRON: 36 UG/DL (ref 37–145)
LDL CHOLESTEROL: 95 MG/DL
LYMPHOCYTES ABSOLUTE: 2.04 K/UL (ref 1.5–4)
LYMPHOCYTES RELATIVE PERCENT: 31 % (ref 20–42)
MCH RBC QN AUTO: 23.6 PG (ref 26–35)
MCHC RBC AUTO-ENTMCNC: 30.3 G/DL (ref 32–34.5)
MCV RBC AUTO: 77.9 FL (ref 80–99.9)
MONOCYTES ABSOLUTE: 0.38 K/UL (ref 0.1–0.95)
MONOCYTES RELATIVE PERCENT: 6 % (ref 2–12)
NEUTROPHILS ABSOLUTE: 3.85 K/UL (ref 1.8–7.3)
NEUTROPHILS RELATIVE PERCENT: 59 % (ref 43–80)
PDW BLD-RTO: 15.1 % (ref 11.5–15)
PLATELET # BLD: 330 K/UL (ref 130–450)
PMV BLD AUTO: 8.9 FL (ref 7–12)
RBC # BLD: 5.17 M/UL (ref 3.5–5.5)
T4 FREE: 1 NG/DL (ref 0.9–1.7)
TOTAL IRON BINDING CAPACITY: 420 UG/DL (ref 250–450)
TRIGLYCERIDE, FASTING: 139 MG/DL
TSH SERPL DL<=0.05 MIU/L-ACNC: 2.24 UIU/ML (ref 0.27–4.2)
VLDLC SERPL CALC-MCNC: 28 MG/DL
WBC # BLD: 6.5 K/UL (ref 4.5–11.5)

## 2025-03-19 PROCEDURE — 99214 OFFICE O/P EST MOD 30 MIN: CPT | Performed by: INTERNAL MEDICINE

## 2025-03-19 PROCEDURE — 36415 COLL VENOUS BLD VENIPUNCTURE: CPT | Performed by: INTERNAL MEDICINE

## 2025-03-19 PROCEDURE — 83036 HEMOGLOBIN GLYCOSYLATED A1C: CPT | Performed by: INTERNAL MEDICINE

## 2025-03-19 PROCEDURE — 99213 OFFICE O/P EST LOW 20 MIN: CPT | Performed by: INTERNAL MEDICINE

## 2025-03-19 SDOH — ECONOMIC STABILITY: FOOD INSECURITY: WITHIN THE PAST 12 MONTHS, THE FOOD YOU BOUGHT JUST DIDN'T LAST AND YOU DIDN'T HAVE MONEY TO GET MORE.: NEVER TRUE

## 2025-03-19 SDOH — ECONOMIC STABILITY: FOOD INSECURITY: WITHIN THE PAST 12 MONTHS, YOU WORRIED THAT YOUR FOOD WOULD RUN OUT BEFORE YOU GOT MONEY TO BUY MORE.: NEVER TRUE

## 2025-03-19 SDOH — ECONOMIC STABILITY: INCOME INSECURITY: IN THE LAST 12 MONTHS, WAS THERE A TIME WHEN YOU WERE NOT ABLE TO PAY THE MORTGAGE OR RENT ON TIME?: NO

## 2025-03-19 ASSESSMENT — PATIENT HEALTH QUESTIONNAIRE - PHQ9
SUM OF ALL RESPONSES TO PHQ QUESTIONS 1-9: 0
6. FEELING BAD ABOUT YOURSELF - OR THAT YOU ARE A FAILURE OR HAVE LET YOURSELF OR YOUR FAMILY DOWN: NOT AT ALL
1. LITTLE INTEREST OR PLEASURE IN DOING THINGS: NOT AT ALL
8. MOVING OR SPEAKING SO SLOWLY THAT OTHER PEOPLE COULD HAVE NOTICED. OR THE OPPOSITE, BEING SO FIGETY OR RESTLESS THAT YOU HAVE BEEN MOVING AROUND A LOT MORE THAN USUAL: NOT AT ALL
9. THOUGHTS THAT YOU WOULD BE BETTER OFF DEAD, OR OF HURTING YOURSELF: NOT AT ALL
4. FEELING TIRED OR HAVING LITTLE ENERGY: NOT AT ALL
2. FEELING DOWN, DEPRESSED OR HOPELESS: NOT AT ALL
10. IF YOU CHECKED OFF ANY PROBLEMS, HOW DIFFICULT HAVE THESE PROBLEMS MADE IT FOR YOU TO DO YOUR WORK, TAKE CARE OF THINGS AT HOME, OR GET ALONG WITH OTHER PEOPLE: NOT DIFFICULT AT ALL
SUM OF ALL RESPONSES TO PHQ QUESTIONS 1-9: 0
5. POOR APPETITE OR OVEREATING: NOT AT ALL
7. TROUBLE CONCENTRATING ON THINGS, SUCH AS READING THE NEWSPAPER OR WATCHING TELEVISION: NOT AT ALL
SUM OF ALL RESPONSES TO PHQ QUESTIONS 1-9: 0
SUM OF ALL RESPONSES TO PHQ QUESTIONS 1-9: 0
3. TROUBLE FALLING OR STAYING ASLEEP: NOT AT ALL

## 2025-03-19 ASSESSMENT — LIFESTYLE VARIABLES
HOW OFTEN DO YOU HAVE A DRINK CONTAINING ALCOHOL: NEVER
HOW MANY STANDARD DRINKS CONTAINING ALCOHOL DO YOU HAVE ON A TYPICAL DAY: PATIENT DOES NOT DRINK

## 2025-03-19 ASSESSMENT — SOCIAL DETERMINANTS OF HEALTH (SDOH): HOW HARD IS IT FOR YOU TO PAY FOR THE VERY BASICS LIKE FOOD, HOUSING, MEDICAL CARE, AND HEATING?: NOT HARD AT ALL

## 2025-03-19 NOTE — PROGRESS NOTES
The following lab draw was performed today:    3 tubes of blood were drawn as follows:    2 green top tube, tube expiration date 4/2/26  1 lavender tube, tube expiration date 6/31/26    Using  #21 G x 1.5\" (green) straight needle and QuickShield safety tube galvez    From [facility] at 1025.    All tubes inverted 20 times. Tubes left in top-side down position while preparing paperwork. Labels attached and also taped for security. Placed in bag and sent to lab via tube system at 1038. See media.

## 2025-03-25 ENCOUNTER — RESULTS FOLLOW-UP (OUTPATIENT)
Dept: INTERNAL MEDICINE | Age: 30
End: 2025-03-25

## 2025-03-25 DIAGNOSIS — D50.9 IRON DEFICIENCY ANEMIA, UNSPECIFIED IRON DEFICIENCY ANEMIA TYPE: Primary | ICD-10-CM

## 2025-03-25 NOTE — TELEPHONE ENCOUNTER
Labs reviewed with Keesha.     TSH and Free T4 are normal.     Lipid panel very good with low HDL but this will likely improve with ongoing lifestyle changes.     Still with iron deficiency but without anemia.  Looking at the trends from previous labs, all indices are improving.  At this time, discussed holding off on IV iron particularly since there is not anemia.  Keesha will trial 3 days a week iron supplementation at 325 mg and will also incorporate high iron foods into her diet. We will then recheck these values prior to her next visit.  I have placed these labs on the chart at this time.     Keesha voices understanding and is in agreement with this plan.     Berkley Corrigan MD  3/25/2025

## 2025-05-21 ENCOUNTER — TELEMEDICINE (OUTPATIENT)
Dept: SLEEP CENTER | Age: 30
End: 2025-05-21
Payer: COMMERCIAL

## 2025-05-21 DIAGNOSIS — G47.33 OBSTRUCTIVE SLEEP APNEA: Primary | ICD-10-CM

## 2025-05-21 PROCEDURE — 99213 OFFICE O/P EST LOW 20 MIN: CPT | Performed by: NURSE PRACTITIONER

## 2025-05-21 ASSESSMENT — SLEEP AND FATIGUE QUESTIONNAIRES
ESS TOTAL SCORE: 9
HOW LIKELY ARE YOU TO NOD OFF OR FALL ASLEEP WHEN YOU ARE A PASSENGER IN A CAR FOR AN HOUR WITHOUT A BREAK: SLIGHT CHANCE OF DOZING
HOW LIKELY ARE YOU TO NOD OFF OR FALL ASLEEP WHILE WATCHING TV: MODERATE CHANCE OF DOZING
HOW LIKELY ARE YOU TO NOD OFF OR FALL ASLEEP WHILE SITTING QUIETLY AFTER LUNCH WITHOUT ALCOHOL: SLIGHT CHANCE OF DOZING
HOW LIKELY ARE YOU TO NOD OFF OR FALL ASLEEP WHEN YOU ARE A PASSENGER IN A CAR FOR AN HOUR WITHOUT A BREAK: SLIGHT CHANCE OF DOZING
HOW LIKELY ARE YOU TO NOD OFF OR FALL ASLEEP WHILE SITTING AND READING: SLIGHT CHANCE OF DOZING
HOW LIKELY ARE YOU TO NOD OFF OR FALL ASLEEP WHILE SITTING AND READING: SLIGHT CHANCE OF DOZING
HOW LIKELY ARE YOU TO NOD OFF OR FALL ASLEEP WHILE LYING DOWN TO REST IN THE AFTERNOON WHEN CIRCUMSTANCES PERMIT: HIGH CHANCE OF DOZING
HOW LIKELY ARE YOU TO NOD OFF OR FALL ASLEEP WHILE SITTING INACTIVE IN A PUBLIC PLACE: SLIGHT CHANCE OF DOZING
HOW LIKELY ARE YOU TO NOD OFF OR FALL ASLEEP IN A CAR, WHILE STOPPED FOR A FEW MINUTES IN TRAFFIC: WOULD NEVER DOZE
HOW LIKELY ARE YOU TO NOD OFF OR FALL ASLEEP WHILE SITTING INACTIVE IN A PUBLIC PLACE: SLIGHT CHANCE OF DOZING
HOW LIKELY ARE YOU TO NOD OFF OR FALL ASLEEP WHILE LYING DOWN TO REST IN THE AFTERNOON WHEN CIRCUMSTANCES PERMIT: HIGH CHANCE OF DOZING
HOW LIKELY ARE YOU TO NOD OFF OR FALL ASLEEP IN A CAR, WHILE STOPPED FOR A FEW MINUTES IN TRAFFIC: WOULD NEVER DOZE
HOW LIKELY ARE YOU TO NOD OFF OR FALL ASLEEP WHILE SITTING QUIETLY AFTER LUNCH WITHOUT ALCOHOL: SLIGHT CHANCE OF DOZING
HOW LIKELY ARE YOU TO NOD OFF OR FALL ASLEEP WHILE WATCHING TV: MODERATE CHANCE OF DOZING
HOW LIKELY ARE YOU TO NOD OFF OR FALL ASLEEP WHILE SITTING AND TALKING TO SOMEONE: WOULD NEVER DOZE
HOW LIKELY ARE YOU TO NOD OFF OR FALL ASLEEP WHILE SITTING AND TALKING TO SOMEONE: WOULD NEVER DOZE

## 2025-05-21 NOTE — PROGRESS NOTES
[facility] Sleep Medicine    Keesha Whitley (:  1995) is a Established patient, presenting virtually for evaluation of the following: VINNY  Age: 29 y.o.   : 1995    Date of Visit: 25        Review of Last Visit Summary:    The patient was last seen on 3/6/2024 for  Obstructive Sleep Apnea.    Interim History:     Keesha Whitley is a 29 y.o. female that  has a past medical history of ADHD, Anxiety, and Hypothyroidism. She presents in follow up to Sleep Clinic to review CPAP adherence and efficacy.     Interval Events:    -Patient continues to do well on CPAP, she is compliant on a nightly basis and subjectively benefits from use.  - Fullface mask.  Receives supplies regularly.  - Denies any new or worsening sleep issues.  - Good energy throughout the day, denies a.m. headaches or other parasomnias.  - Pressure tolerable.    DME: [facility]    Sleep Study History: 1. 2018- PSG-sleep efficiency 79%, REM 5%, AHI 3.4, SPO2 giles 87%, T<90% was <1% of TST    2. 5- HST @ [facility]- wt 300 lbs, AHI 23, MARTI 22, SpO2 giles 77%, T<90% was 5% of total O2 evaluation period.    Sleep History:  She had a sleep study done in 2018 with an AHI of 3.4, but has had worsening symptoms of sleep apnea  Previous study.  She does have a positive family history.     Patient states that she is not rested upon waking up and feels very tired throughout the day.  She says there are times she feels she can fall within a few seconds, for example, when she went to a hockey game for a class field trip.  In the morning, she has to start to several alarms and feels very groggy and unable to wake up until 30 to 45 minutes after her alarm goes off.  She does state that she snores loudly, but does not express witnessed nocturnal apneas.  On rare occasion, she does have nocturnal gasping.  She does sometimes have difficulty with memory and a nuclear a.m. headache.  Patient denies drowsy driving unless she is driving

## 2025-05-31 ENCOUNTER — PATIENT MESSAGE (OUTPATIENT)
Age: 30
End: 2025-05-31

## 2025-05-31 DIAGNOSIS — Z11.1 SCREENING EXAMINATION FOR PULMONARY TUBERCULOSIS: Primary | ICD-10-CM

## 2025-05-31 DIAGNOSIS — Z01.84 IMMUNITY TO MEASLES, MUMPS, AND RUBELLA DETERMINED BY SEROLOGIC TEST: ICD-10-CM

## 2025-07-16 ENCOUNTER — PATIENT MESSAGE (OUTPATIENT)
Dept: PRIMARY CARE CLINIC | Age: 30
End: 2025-07-16

## 2025-07-24 ENCOUNTER — TELEPHONE (OUTPATIENT)
Dept: PRIMARY CARE CLINIC | Age: 30
End: 2025-07-24

## 2025-07-24 RX ORDER — OMEPRAZOLE 40 MG/1
40 CAPSULE, DELAYED RELEASE ORAL
Qty: 14 CAPSULE | Refills: 0 | Status: SHIPPED | OUTPATIENT
Start: 2025-07-24

## 2025-07-24 NOTE — TELEPHONE ENCOUNTER
Keesha has been taking a course of doxycycline and when she took her Monday night dose, she laid down to bed.  She woke up with burning and has been having odynophagia and burning.  Likely pill esophagitis.  Able to swallow food but with pain.  Will prescribe 2 weeks of PPI.  If any worsening, Keesha should notify me.    Keesha voiced understanding.     Berkley Corrigan MD  7/24/2025

## 2025-08-05 RX ORDER — OMEPRAZOLE 40 MG/1
40 CAPSULE, DELAYED RELEASE ORAL
Qty: 90 CAPSULE | Refills: 1 | Status: SHIPPED | OUTPATIENT
Start: 2025-08-05

## 2025-09-01 DIAGNOSIS — E03.9 ACQUIRED HYPOTHYROIDISM: ICD-10-CM

## 2025-09-02 RX ORDER — LEVOTHYROXINE SODIUM 50 UG/1
50 TABLET ORAL DAILY
Qty: 90 TABLET | Refills: 1 | Status: SHIPPED | OUTPATIENT
Start: 2025-09-02